# Patient Record
Sex: FEMALE | Race: WHITE | NOT HISPANIC OR LATINO | Employment: OTHER | URBAN - METROPOLITAN AREA
[De-identification: names, ages, dates, MRNs, and addresses within clinical notes are randomized per-mention and may not be internally consistent; named-entity substitution may affect disease eponyms.]

---

## 2017-01-25 ENCOUNTER — OFFICE VISIT (OUTPATIENT)
Dept: CARDIOLOGY | Facility: MEDICAL CENTER | Age: 57
End: 2017-01-25
Payer: MEDICARE

## 2017-01-25 VITALS
BODY MASS INDEX: 32.39 KG/M2 | SYSTOLIC BLOOD PRESSURE: 116 MMHG | WEIGHT: 182.8 LBS | OXYGEN SATURATION: 93 % | DIASTOLIC BLOOD PRESSURE: 70 MMHG | HEIGHT: 63 IN | HEART RATE: 64 BPM

## 2017-01-25 DIAGNOSIS — E78.00 PURE HYPERCHOLESTEROLEMIA: ICD-10-CM

## 2017-01-25 DIAGNOSIS — I45.6 WPW (WOLFF-PARKINSON-WHITE SYNDROME): ICD-10-CM

## 2017-01-25 DIAGNOSIS — E11.9 TYPE 2 DIABETES MELLITUS WITHOUT COMPLICATION, WITHOUT LONG-TERM CURRENT USE OF INSULIN (HCC): ICD-10-CM

## 2017-01-25 DIAGNOSIS — I45.10 RBBB: ICD-10-CM

## 2017-01-25 DIAGNOSIS — I44.5: ICD-10-CM

## 2017-01-25 DIAGNOSIS — I10 ESSENTIAL HYPERTENSION: ICD-10-CM

## 2017-01-25 DIAGNOSIS — I42.1 HYPERTROPHIC OBSTRUCTIVE CARDIOMYOPATHY (HCC): ICD-10-CM

## 2017-01-25 PROCEDURE — 99214 OFFICE O/P EST MOD 30 MIN: CPT | Performed by: NURSE PRACTITIONER

## 2017-01-25 PROCEDURE — 3017F COLORECTAL CA SCREEN DOC REV: CPT | Mod: 8P | Performed by: NURSE PRACTITIONER

## 2017-01-25 PROCEDURE — 1036F TOBACCO NON-USER: CPT | Performed by: NURSE PRACTITIONER

## 2017-01-25 PROCEDURE — G8432 DEP SCR NOT DOC, RNG: HCPCS | Performed by: NURSE PRACTITIONER

## 2017-01-25 PROCEDURE — G8419 CALC BMI OUT NRM PARAM NOF/U: HCPCS | Performed by: NURSE PRACTITIONER

## 2017-01-25 PROCEDURE — 3045F PR MOST RECENT HEMOGLOBIN A1C LEVEL 7.0-9.0%: CPT | Performed by: NURSE PRACTITIONER

## 2017-01-25 PROCEDURE — G8484 FLU IMMUNIZE NO ADMIN: HCPCS | Performed by: NURSE PRACTITIONER

## 2017-01-25 PROCEDURE — 3014F SCREEN MAMMO DOC REV: CPT | Mod: 8P | Performed by: NURSE PRACTITIONER

## 2017-01-25 ASSESSMENT — ENCOUNTER SYMPTOMS
CLAUDICATION: 0
ORTHOPNEA: 0
PND: 0
FEVER: 0
COUGH: 0
ABDOMINAL PAIN: 0
PALPITATIONS: 0
DIZZINESS: 0
MYALGIAS: 0
SHORTNESS OF BREATH: 0

## 2017-01-25 NOTE — Clinical Note
[facility]   [address] [facility]  [place] [address]  Phone: [phone]  Fax: [phone]              Kizzy Chapman  1960    Encounter Date: 1/25/2017    TASHA Arnold          PROGRESS NOTE:  Subjective:   Kizzy Chapman is a 55 y.o. female who presents today for 6 month follow up.    She is a patient of Dr. Arriaga in our office. Hx of HTN, HLD, DM, and hypertrophic cardiomyopathy with moderately-severe LVH.    Her BP is much more controlled now. She is feeling great. She did forget to get her labs done, she will do them this week.    She knows she has put on weight and needs to work on her diet/exercise.    She has occasional palpitations but not many.     She has had no episodes of chest pain, dizziness/lightheadedness, shortness of breath, orthopnea, or peripheral edema.    Past Medical History   Diagnosis Date   • Anxiety    • Hypertension    • Hypercholesterolemia    • Psychiatric disorder    • Hypertrophic obstructive cardiomyopathy(425.11) 4/1/2015   • Diabetes (CMS-HCC)    • Obesity      Past Surgical History   Procedure Laterality Date   • Other cardiac surgery       ablation of heart for WPW   • Other orthopedic surgery       tib fib with pins; left total hip replacement   • Aicd implant       History reviewed. No pertinent family history.  History   Smoking status   • Former Smoker -- 0.10 packs/day for 10 years   • Types: Cigarettes   • Quit date: 04/27/2015   Smokeless tobacco   • Never Used     Allergies   Allergen Reactions   • Epinephrine      Tightness in arms and legs     Outpatient Encounter Prescriptions as of 1/25/2017   Medication Sig Dispense Refill   • atorvastatin (LIPITOR) 40 MG Tab Take 1 Tab by mouth every day. 90 Tab 3   • metoprolol (LOPRESSOR) 100 MG Tab Take 1 Tab by mouth 2 times a day. 180 Tab 3   • diltiazem CD (CARDIZEM CD) 120 MG CAPSULE SR 24 HR Take 2 Caps by mouth every day. 180 Cap 3   •  "oxycodone-acetaminophen (PERCOCET) 5-325 MG Tab Take 1-2 Tabs by mouth every four hours as needed.     • insulin aspart (NOVOLOG) 100 UNIT/ML Solution Inject  as instructed 3 times a day before meals.     • Cholecalciferol (VITAMIN D) 400 UNIT Tab Take 400 Units by mouth 2 Times a Day.     • metformin (GLUCOPHAGE) 500 MG Tab Take 500 mg by mouth every day.     • Alendronate Sodium (FOSAMAX PO) Take  by mouth.     • MAGNESIUM PO Take  by mouth.     • insulin glargine (LANTUS) 100 UNIT/ML SOLN Inject 2-5 Units as instructed 2 Times a Day. Based on carbs.     • Calcium Carbonate-Vitamin D (CALCIUM + D PO) Take 1 Tab by mouth 2 Times a Day.     • B Complex Vitamins (VITAMIN B COMPLEX PO) Take 1 Tab by mouth every day.     • clonidine (CATAPRES) 0.1 MG Tab Take 1 Tab by mouth as needed (Elevated BP >160; up to TID). 90 Tab 3   • diazepam (VALIUM) 10 MG tablet Take 10 mg by mouth every bedtime.       No facility-administered encounter medications on file as of 1/25/2017.     Review of Systems   Constitutional: Negative for fever and malaise/fatigue.   Respiratory: Negative for cough and shortness of breath.    Cardiovascular: Negative for chest pain, palpitations, orthopnea, claudication, leg swelling and PND.   Gastrointestinal: Negative for abdominal pain.   Musculoskeletal: Negative for myalgias.        R ankle boot post fibula fracture; crutches; wheelchair   Neurological: Negative for dizziness.   Psychiatric/Behavioral:        Tearful about HTN and moderate LVH   All other systems reviewed and are negative.       Objective:   /70 mmHg  Pulse 64  Ht 1.6 m (5' 3\")  Wt 82.918 kg (182 lb 12.8 oz)  BMI 32.39 kg/m2  SpO2 93%    Physical Exam   Constitutional: She is oriented to person, place, and time. She appears well-developed. No distress.   obese   HENT:   Head: Normocephalic and atraumatic.   Eyes: EOM are normal.   Neck: Normal range of motion. No JVD present.   Cardiovascular: Normal rate, regular " rhythm, normal heart sounds and intact distal pulses.    No murmur heard.  Pulmonary/Chest: Effort normal and breath sounds normal. No respiratory distress.   Abdominal: Soft. Bowel sounds are normal.   Musculoskeletal: Normal range of motion. She exhibits no edema.   Neurological: She is alert and oriented to person, place, and time.   Skin: Skin is warm and dry.   Psychiatric: She has a normal mood and affect.   Nursing note and vitals reviewed.    Lab Results   Component Value Date/Time    CHOLESTEROL,* 05/25/2016 12:30 PM    * 05/25/2016 12:30 PM    HDL 45 05/25/2016 12:30 PM    TRIGLYCERIDES 301* 05/25/2016 12:30 PM       Lab Results   Component Value Date/Time    SODIUM 138 05/25/2016 12:30 PM    POTASSIUM 3.9 05/25/2016 12:30 PM    CHLORIDE 104 05/25/2016 12:30 PM    CO2 24 05/25/2016 12:30 PM    GLUCOSE 179* 05/25/2016 12:30 PM    BUN 11 05/25/2016 12:30 PM    CREATININE 0.57 05/25/2016 12:30 PM     Lab Results   Component Value Date/Time    ALKALINE PHOSPHATASE 73 05/25/2016 12:30 PM    AST(SGOT) 23 05/25/2016 12:30 PM    ALT(SGPT) 34 05/25/2016 12:30 PM    TOTAL BILIRUBIN 0.5 05/25/2016 12:30 PM      Lab Results   Component Value Date/Time    WBC 7.6 05/25/2016 12:30 PM    RBC 4.71 05/25/2016 12:30 PM    HEMOGLOBIN 14.5 05/25/2016 12:30 PM    HEMATOCRIT 43.3 05/25/2016 12:30 PM    MCV 91.9 05/25/2016 12:30 PM    MCH 30.8 05/25/2016 12:30 PM    MCHC 33.5* 05/25/2016 12:30 PM    MPV 9.2 05/25/2016 12:30 PM      3/4/15 ECHO CONCLUSIONS  Hyperdynamic left ventricular systolic function with near systolic   cavity obliteration.  Left ventricular ejection fraction is greater than 75%.  Moderately severe concentric left ventricular hypertrophy.  Grade I diastolic dysfunction.  No significant valve disease or flow abnormalities.   Unable to estimate pulmonary artery pressure due to an inadequate   tricuspid regurgitant jet.    Assessment:     1. Type 2 diabetes mellitus without complication, without  long-term current use of insulin (HCC)  HEMOGLOBIN A1C   2. Essential hypertension  ECHOCARDIOGRAM COMP W/O CONT   3. Pure hypercholesterolemia  ECHOCARDIOGRAM COMP W/O CONT   4. Hypertrophic obstructive cardiomyopathy (CMS-HCC)  ECHOCARDIOGRAM COMP W/O CONT   5. Left posterior fascicular hemiblock     6. RBBB     7. WPW (Ernesto-Parkinson-White syndrome) s/p ablation 1997         Medical Decision Making:  Today's Assessment / Status / Plan:     1. WPW with previous ablation in '97, stable with minimal palpitations. Good rate control on metoprolol and diltiazem.    2. Left hemiblock and RBBB, stable. Follow clinically.    3. HTN, improved control. Continue metoprolol at 100 mg BID, diltiazem 120 mg BID, and use clonidine 0.1 mg PRN for SBP >160. She has had great control with only needing clonidine a few times since our last apt.    4. HLD, Lipitor. LDL goal <100 with DM. Check lipid and CMP soon.    5. DM, managed by PCP.    6. Hypertrophic cardiomyopathy with moderately severe LVH, stable. Repeat echo ordered for review.    FU in clinic in 6 months with TT or SC with yearly lab and echo review    Patient verbalizes understanding and agrees with the plan of care.     Collaborating MD: Olimpia SALCEDO    Spent 25 minutes in face-to-face patient contact in which greater than 50% of the visit was spent in counseling/coordination of care of medication management, symptom management, re-assurance, discussion of HTN, LVH, and ordered labs and echo.        No Recipients

## 2017-01-25 NOTE — MR AVS SNAPSHOT
"        Kizzy Chapman   2017 3:40 PM   Office Visit   MRN: [id number]    Department:  [facility]   Dept Phone:  [phone]    Description:  Female : 1960   Provider:  TASHA Arnold           Reason for Visit     Follow-Up           Allergies as of 2017     Allergen Noted Reactions    Epinephrine 2015       Tightness in arms and legs      You were diagnosed with     Type 2 diabetes mellitus without complication, without long-term current use of insulin (CMS-[facility])   [6878053]       Essential hypertension   [5457262]       Pure hypercholesterolemia   [272.0.ICD-9-CM]       Hypertrophic obstructive cardiomyopathy (CMS-[facility])   [0954938]       Left posterior fascicular hemiblock   [357408]       RBBB   [258447]       WPW (Ernesto-Parkinson-White syndrome)   [521086]         Vital Signs     Blood Pressure Pulse Height Weight Body Mass Index Oxygen Saturation    116/70 mmHg 64 1.6 m (5' 3\") 82.918 kg (182 lb 12.8 oz) 32.39 kg/m2 93%    Smoking Status                   Former Smoker           Basic Information     Date Of Birth Sex Race Ethnicity Preferred Language    1960 Female White Non- English      Your appointments     2017  4:15 PM   ECHO with ECHO [facility], [facility] EXAM 9   ECHOCARDIOLOGY [facility] ([facility])    [address] [address]   [phone]           No prep            Mar 29, 2017  3:40 PM   FOLLOW UP with VINAYAK ArnoldRARELIS   [facility] (--)    [address], [address]  [facility] [id number] [phone]              Problem List              ICD-10-CM Priority Class Noted - Resolved    Left posterior fascicular hemiblock I44.5 Medium  3/3/2015 - Present    RBBB I45.10 Medium  3/3/2015 - Present    WPW (Ernesto-Parkinson-White syndrome) s/p ablation  I45.6 Medium  3/3/2015 - Present    HTN (hypertension) I10 Medium  3/3/2015 - Present    Hyperlipidemia E78.5 Medium  3/3/2015 - Present    DM2 " (diabetes mellitus, type 2) (CMS-HCC) E11.9 Medium  3/3/2015 - Present    Hypertrophic obstructive cardiomyopathy (CMS-HCC) I42.1 Medium  4/1/2015 - Present    Migraine G43.909 Low  5/1/2015 - Present      Health Maintenance        Date Due Completion Dates    IMM HEP B VACCINE (1 of 3 - Primary Series) 1960 ---    DIABETES MONOFILAMENT / LE EXAM 3/30/1961 ---    RETINAL SCREENING 9/30/1978 ---    URINE ACR / MICROALBUMIN 9/30/1978 ---    IMM DTaP/Tdap/Td Vaccine (1 - Tdap) 9/30/1979 ---    IMM PNEUMOCOCCAL 19-64 (ADULT) MEDIUM RISK SERIES (1 of 1 - PPSV23) 9/30/1979 ---    PAP SMEAR 9/30/1981 ---    MAMMOGRAM 9/30/2000 ---    COLONOSCOPY 9/30/2010 ---    IMM INFLUENZA (1) 9/1/2016 ---    A1C SCREENING 11/25/2016 5/25/2016    FASTING LIPID PROFILE 5/25/2017 5/25/2016    SERUM CREATININE 5/25/2017 5/25/2016, 5/15/2016, 4/1/2016, 2/22/2016, 3/22/2015, 3/21/2015, 8/22/2013            Current Immunizations     No immunizations on file.      Below and/or attached are the medications your provider expects you to take. Review all of your home medications and newly ordered medications with your provider and/or pharmacist. Follow medication instructions as directed by your provider and/or pharmacist. Please keep your medication list with you and share with your provider. Update the information when medications are discontinued, doses are changed, or new medications (including over-the-counter products) are added; and carry medication information at all times in the event of emergency situations     Allergies:  EPINEPHRINE - (reactions not documented)               Medications  Valid as of: January 25, 2017 -  3:59 PM    Generic Name Brand Name Tablet Size Instructions for use    Alendronate Sodium   Take  by mouth.        Atorvastatin Calcium (Tab) LIPITOR 40 MG Take 1 Tab by mouth every day.        B Complex Vitamins   Take 1 Tab by mouth every day.        Calcium Citrate-Vitamin D   Take 1 Tab by mouth 2 Times a Day.          Cholecalciferol (Tab) VITAMIN D 400 UNIT Take 400 Units by mouth 2 Times a Day.        CloNIDine HCl (Tab) CATAPRES 0.1 MG Take 1 Tab by mouth as needed (Elevated BP >160; up to TID).        DiazePAM (Tab) VALIUM 10 MG Take 10 mg by mouth every bedtime.        DiltiaZEM HCl Coated Beads (CAPSULE SR 24 HR) CARDIZEM  MG Take 2 Caps by mouth every day.        Insulin Aspart (Solution) NOVOLOG 100 UNIT/ML Inject  as instructed 3 times a day before meals.        Insulin Glargine (Solution) LANTUS 100 UNIT/ML Inject 2-5 Units as instructed 2 Times a Day. Based on carbs.        Magnesium   Take  by mouth.        MetFORMIN HCl (Tab) GLUCOPHAGE 500 MG Take 500 mg by mouth every day.        Metoprolol Tartrate (Tab) LOPRESSOR 100 MG Take 1 Tab by mouth 2 times a day.        Oxycodone-Acetaminophen (Tab) PERCOCET 5-325 MG Take 1-2 Tabs by mouth every four hours as needed.        .                 Medicines prescribed today were sent to:     [facility][id number] - [place], [place] - [address] [place] [id number]    Phone: [phone] Fax: [phone]    Open 24 Hours?: No      Medication refill instructions:       If your prescription bottle indicates you have medication refills left, it is not necessary to call your provider’s office. Please contact your pharmacy and they will refill your medication.    If your prescription bottle indicates you do not have any refills left, you may request refills at any time through one of the following ways: The online Game[employer] system (except Urgent Care), by calling your provider’s office, or by asking your pharmacy to contact your provider’s office with a refill request. Medication refills are processed only during regular business hours and may not be available until the next business day. Your provider may request additional information or to have a follow-up visit with you prior to refilling your medication.   *Please Note:  Medication refills are assigned a new Rx number when refilled electronically. Your pharmacy may indicate that no refills were authorized even though a new prescription for the same medication is available at the pharmacy. Please request the medicine by name with the pharmacy before contacting your provider for a refill.        Your To Do List     Future Labs/Procedures Complete By Expires    ECHOCARDIOGRAM COMP W/O CONT  As directed 1/26/2018    HEMOGLOBIN A1C  As directed 1/26/2018         SurDoc Access Code: [email or web address]  Expires: 2/24/2017  3:59 PM    SurDoc  A secure, online tool to manage your health information     [employer]’s SurDoc® is a secure, online tool that connects you to your personalized health information from the privacy of your home -- day or night - making it very easy for you to manage your healthcare. Once the activation process is completed, you can even access your medical information using the SurDoc juliet, which is available for free in the Apple Juliet store or Google Play store.     SurDoc provides the following levels of access (as shown below):   My Chart Features   [facility] Doctor [facility]  Specialists [facility]  Urgent  Care Non-[facility]  Primary Care  Doctor   Email your healthcare team securely and privately 24/7 X X X    Manage appointments: schedule your next appointment; view details of past/upcoming appointments X      Request prescription refills. X      View recent personal medical records, including lab and immunizations X X X X   View health record, including health history, allergies, medications X X X X   Read reports about your outpatient visits, procedures, consult and ER notes X X X X   See your discharge summary, which is a recap of your hospital and/or ER visit that includes your diagnosis, lab results, and care plan. X X       How to register for SurDoc:  1. Go to  [email or web address].  2. Click on the Sign Up Now box, which takes you to the New  Member Sign Up page. You will need to provide the following information:  a. Enter your Se[employer]ore Access Code exactly as it appears at the top of this page. (You will not need to use this code after you’ve completed the sign-up process. If you do not sign up before the expiration date, you must request a new code.)   b. Enter your date of birth.   c. Enter your home email address.   d. Click Submit, and follow the next screen’s instructions.  3. Create a Zeppelint ID. This will be your Seclore login ID and cannot be changed, so think of one that is secure and easy to remember.  4. Create a Seclore password. You can change your password at any time.  5. Enter your Password Reset Question and Answer. This can be used at a later time if you forget your password.   6. Enter your e-mail address. This allows you to receive e-mail notifications when new information is available in Seclore.  7. Click Sign Up. You can now view your health information.    For assistance activating your Seclore account, call [phone]

## 2017-01-26 NOTE — PROGRESS NOTES
Subjective:   Kizzy Chapman is a 55 y.o. female who presents today for 6 month follow up.    She is a patient of Dr. Arriaga in our office. Hx of HTN, HLD, DM, and hypertrophic cardiomyopathy with moderately-severe LVH.    Her BP is much more controlled now. She is feeling great. She did forget to get her labs done, she will do them this week.    She knows she has put on weight and needs to work on her diet/exercise.    She has occasional palpitations but not many.     She has had no episodes of chest pain, dizziness/lightheadedness, shortness of breath, orthopnea, or peripheral edema.    Past Medical History   Diagnosis Date   • Anxiety    • Hypertension    • Hypercholesterolemia    • Psychiatric disorder    • Hypertrophic obstructive cardiomyopathy(425.11) 4/1/2015   • Diabetes (CMS-HCC)    • Obesity      Past Surgical History   Procedure Laterality Date   • Other cardiac surgery       ablation of heart for WPW   • Other orthopedic surgery       tib fib with pins; left total hip replacement   • Aicd implant       History reviewed. No pertinent family history.  History   Smoking status   • Former Smoker -- 0.10 packs/day for 10 years   • Types: Cigarettes   • Quit date: 04/27/2015   Smokeless tobacco   • Never Used     Allergies   Allergen Reactions   • Epinephrine      Tightness in arms and legs     Outpatient Encounter Prescriptions as of 1/25/2017   Medication Sig Dispense Refill   • atorvastatin (LIPITOR) 40 MG Tab Take 1 Tab by mouth every day. 90 Tab 3   • metoprolol (LOPRESSOR) 100 MG Tab Take 1 Tab by mouth 2 times a day. 180 Tab 3   • diltiazem CD (CARDIZEM CD) 120 MG CAPSULE SR 24 HR Take 2 Caps by mouth every day. 180 Cap 3   • oxycodone-acetaminophen (PERCOCET) 5-325 MG Tab Take 1-2 Tabs by mouth every four hours as needed.     • insulin aspart (NOVOLOG) 100 UNIT/ML Solution Inject  as instructed 3 times a day before meals.     • Cholecalciferol (VITAMIN D) 400 UNIT Tab Take 400 Units by mouth 2 Times a  "Day.     • metformin (GLUCOPHAGE) 500 MG Tab Take 500 mg by mouth every day.     • Alendronate Sodium (FOSAMAX PO) Take  by mouth.     • MAGNESIUM PO Take  by mouth.     • insulin glargine (LANTUS) 100 UNIT/ML SOLN Inject 2-5 Units as instructed 2 Times a Day. Based on carbs.     • Calcium Carbonate-Vitamin D (CALCIUM + D PO) Take 1 Tab by mouth 2 Times a Day.     • B Complex Vitamins (VITAMIN B COMPLEX PO) Take 1 Tab by mouth every day.     • clonidine (CATAPRES) 0.1 MG Tab Take 1 Tab by mouth as needed (Elevated BP >160; up to TID). 90 Tab 3   • diazepam (VALIUM) 10 MG tablet Take 10 mg by mouth every bedtime.       No facility-administered encounter medications on file as of 1/25/2017.     Review of Systems   Constitutional: Negative for fever and malaise/fatigue.   Respiratory: Negative for cough and shortness of breath.    Cardiovascular: Negative for chest pain, palpitations, orthopnea, claudication, leg swelling and PND.   Gastrointestinal: Negative for abdominal pain.   Musculoskeletal: Negative for myalgias.        R ankle boot post fibula fracture; crutches; wheelchair   Neurological: Negative for dizziness.   Psychiatric/Behavioral:        Tearful about HTN and moderate LVH   All other systems reviewed and are negative.       Objective:   /70 mmHg  Pulse 64  Ht 1.6 m (5' 3\")  Wt 82.918 kg (182 lb 12.8 oz)  BMI 32.39 kg/m2  SpO2 93%    Physical Exam   Constitutional: She is oriented to person, place, and time. She appears well-developed. No distress.   obese   HENT:   Head: Normocephalic and atraumatic.   Eyes: EOM are normal.   Neck: Normal range of motion. No JVD present.   Cardiovascular: Normal rate, regular rhythm, normal heart sounds and intact distal pulses.    No murmur heard.  Pulmonary/Chest: Effort normal and breath sounds normal. No respiratory distress.   Abdominal: Soft. Bowel sounds are normal.   Musculoskeletal: Normal range of motion. She exhibits no edema.   Neurological: She " is alert and oriented to person, place, and time.   Skin: Skin is warm and dry.   Psychiatric: She has a normal mood and affect.   Nursing note and vitals reviewed.    Lab Results   Component Value Date/Time    CHOLESTEROL,* 05/25/2016 12:30 PM    * 05/25/2016 12:30 PM    HDL 45 05/25/2016 12:30 PM    TRIGLYCERIDES 301* 05/25/2016 12:30 PM       Lab Results   Component Value Date/Time    SODIUM 138 05/25/2016 12:30 PM    POTASSIUM 3.9 05/25/2016 12:30 PM    CHLORIDE 104 05/25/2016 12:30 PM    CO2 24 05/25/2016 12:30 PM    GLUCOSE 179* 05/25/2016 12:30 PM    BUN 11 05/25/2016 12:30 PM    CREATININE 0.57 05/25/2016 12:30 PM     Lab Results   Component Value Date/Time    ALKALINE PHOSPHATASE 73 05/25/2016 12:30 PM    AST(SGOT) 23 05/25/2016 12:30 PM    ALT(SGPT) 34 05/25/2016 12:30 PM    TOTAL BILIRUBIN 0.5 05/25/2016 12:30 PM      Lab Results   Component Value Date/Time    WBC 7.6 05/25/2016 12:30 PM    RBC 4.71 05/25/2016 12:30 PM    HEMOGLOBIN 14.5 05/25/2016 12:30 PM    HEMATOCRIT 43.3 05/25/2016 12:30 PM    MCV 91.9 05/25/2016 12:30 PM    MCH 30.8 05/25/2016 12:30 PM    MCHC 33.5* 05/25/2016 12:30 PM    MPV 9.2 05/25/2016 12:30 PM      3/4/15 ECHO CONCLUSIONS  Hyperdynamic left ventricular systolic function with near systolic   cavity obliteration.  Left ventricular ejection fraction is greater than 75%.  Moderately severe concentric left ventricular hypertrophy.  Grade I diastolic dysfunction.  No significant valve disease or flow abnormalities.   Unable to estimate pulmonary artery pressure due to an inadequate   tricuspid regurgitant jet.    Assessment:     1. Type 2 diabetes mellitus without complication, without long-term current use of insulin (HCC)  HEMOGLOBIN A1C   2. Essential hypertension  ECHOCARDIOGRAM COMP W/O CONT   3. Pure hypercholesterolemia  ECHOCARDIOGRAM COMP W/O CONT   4. Hypertrophic obstructive cardiomyopathy (CMS-HCC)  ECHOCARDIOGRAM COMP W/O CONT   5. Left posterior  fascicular hemiblock     6. RBBB     7. WPW (Ernesto-Parkinson-White syndrome) s/p ablation 1997         Medical Decision Making:  Today's Assessment / Status / Plan:     1. WPW with previous ablation in '97, stable with minimal palpitations. Good rate control on metoprolol and diltiazem.    2. Left hemiblock and RBBB, stable. Follow clinically.    3. HTN, improved control. Continue metoprolol at 100 mg BID, diltiazem 120 mg BID, and use clonidine 0.1 mg PRN for SBP >160. She has had great control with only needing clonidine a few times since our last apt.    4. HLD, Lipitor. LDL goal <100 with DM. Check lipid and CMP soon.    5. DM, managed by PCP.    6. Hypertrophic cardiomyopathy with moderately severe LVH, stable. Repeat echo ordered for review.    FU in clinic in 6 months with TT or SC with yearly lab and echo review    Patient verbalizes understanding and agrees with the plan of care.     Collaborating MD: Olimpia SALCEDO    Spent 25 minutes in face-to-face patient contact in which greater than 50% of the visit was spent in counseling/coordination of care of medication management, symptom management, re-assurance, discussion of HTN, LVH, and ordered labs and echo.

## 2017-02-09 LAB
ALBUMIN SERPL-MCNC: 4.4 G/DL (ref 3.5–5.5)
ALBUMIN/GLOB SERPL: 1.8 {RATIO} (ref 1.1–2.5)
ALP SERPL-CCNC: 104 IU/L (ref 39–117)
ALT SERPL-CCNC: 26 IU/L (ref 0–32)
AST SERPL-CCNC: 14 IU/L (ref 0–40)
BILIRUB SERPL-MCNC: 0.3 MG/DL (ref 0–1.2)
BUN SERPL-MCNC: 12 MG/DL (ref 6–24)
BUN/CREAT SERPL: 18 (ref 9–23)
CALCIUM SERPL-MCNC: 10 MG/DL (ref 8.7–10.2)
CHLORIDE SERPL-SCNC: 97 MMOL/L (ref 96–106)
CHOLEST SERPL-MCNC: 232 MG/DL (ref 100–199)
CO2 SERPL-SCNC: 25 MMOL/L (ref 18–29)
COMMENT 011824: ABNORMAL
CREAT SERPL-MCNC: 0.68 MG/DL (ref 0.57–1)
GLOBULIN SER CALC-MCNC: 2.4 G/DL (ref 1.5–4.5)
GLUCOSE SERPL-MCNC: 253 MG/DL (ref 65–99)
HBA1C MFR BLD: 10.1 % (ref 4.8–5.6)
HDLC SERPL-MCNC: 43 MG/DL
LDLC SERPL CALC-MCNC: ABNORMAL MG/DL (ref 0–99)
POTASSIUM SERPL-SCNC: 4.5 MMOL/L (ref 3.5–5.2)
PROT SERPL-MCNC: 6.8 G/DL (ref 6–8.5)
SODIUM SERPL-SCNC: 138 MMOL/L (ref 134–144)
TRIGL SERPL-MCNC: 515 MG/DL (ref 0–149)
VLDLC SERPL CALC-MCNC: ABNORMAL MG/DL (ref 5–40)

## 2017-02-14 ENCOUNTER — APPOINTMENT (OUTPATIENT)
Dept: CARDIOLOGY | Facility: MEDICAL CENTER | Age: 57
End: 2017-02-14
Attending: NURSE PRACTITIONER
Payer: MEDICARE

## 2017-02-16 ENCOUNTER — TELEPHONE (OUTPATIENT)
Dept: CARDIOLOGY | Facility: MEDICAL CENTER | Age: 57
End: 2017-02-16

## 2017-02-16 DIAGNOSIS — E11.00 TYPE 2 DIABETES MELLITUS WITH HYPEROSMOLARITY WITHOUT COMA, WITHOUT LONG-TERM CURRENT USE OF INSULIN (HCC): ICD-10-CM

## 2017-02-16 DIAGNOSIS — I10 ESSENTIAL HYPERTENSION: ICD-10-CM

## 2017-02-16 DIAGNOSIS — E78.00 PURE HYPERCHOLESTEROLEMIA: ICD-10-CM

## 2017-02-16 DIAGNOSIS — E78.5 HYPERLIPIDEMIA, UNSPECIFIED HYPERLIPIDEMIA TYPE: ICD-10-CM

## 2017-02-16 RX ORDER — ATORVASTATIN CALCIUM 80 MG/1
80 TABLET, FILM COATED ORAL DAILY
Qty: 90 TAB | Refills: 3 | OUTPATIENT
Start: 2017-02-16 | End: 2018-04-10 | Stop reason: SDUPTHER

## 2017-02-16 NOTE — TELEPHONE ENCOUNTER
----- Message from TASHA Arnold sent at 2/10/2017  8:57 AM PST -----  Sugar is extremely elevated and her HgbA1c shows poor control of her DM-have her call her PCP for review and possible medication change. Her cholesterol is also in poor control, increase her lipitor to 80 mg QPM and repeat CMP and lipid panel in 6 weeks for review. SC

## 2017-02-16 NOTE — TELEPHONE ENCOUNTER
S/w pt and discussed lab results in detail. She agrees to increasing lipitor to 80mg QD and has f/u w/ her PCP, Dr. Jaimes, on the 20th. We also discussed lifestyle changes including changes in diet and exercise. She agrees to getting repeat labs done just prior to seeing SC 3/29. Orders placed.     Called in increase in lipitor to pts pharmacy.

## 2017-03-04 ENCOUNTER — HOSPITAL ENCOUNTER (EMERGENCY)
Facility: MEDICAL CENTER | Age: 57
End: 2017-03-05
Payer: MEDICARE

## 2017-03-04 VITALS
SYSTOLIC BLOOD PRESSURE: 201 MMHG | DIASTOLIC BLOOD PRESSURE: 95 MMHG | OXYGEN SATURATION: 93 % | RESPIRATION RATE: 14 BRPM | TEMPERATURE: 97 F | BODY MASS INDEX: 31.68 KG/M2 | HEART RATE: 72 BPM | HEIGHT: 63 IN | WEIGHT: 178.79 LBS

## 2017-03-04 PROCEDURE — 302449 STATCHG TRIAGE ONLY (STATISTIC)

## 2017-03-05 NOTE — ED NOTES
"Chief Complaint   Patient presents with   • Migraine   • Hypertension     Patient ambulatory to triage. Patient states that she has been \"very stressed\" due to a break up with her boyfriend a couple of days ago. Patient states that she had three auro's today, then a right frontal migraine. Patient had increased BP on arrival, says that she takes her HTN medications as prescribed. /109 mmHg  Pulse 73  Temp(Src) 36 °C (96.8 °F)  Resp 16  Ht 1.6 m (5' 3\")  Wt 81.1 kg (178 lb 12.7 oz)  BMI 31.68 kg/m2  SpO2 96%     "

## 2017-03-15 ENCOUNTER — HOSPITAL ENCOUNTER (EMERGENCY)
Facility: MEDICAL CENTER | Age: 57
End: 2017-03-15
Attending: EMERGENCY MEDICINE
Payer: MEDICARE

## 2017-03-15 ENCOUNTER — APPOINTMENT (OUTPATIENT)
Dept: RADIOLOGY | Facility: MEDICAL CENTER | Age: 57
End: 2017-03-15
Attending: EMERGENCY MEDICINE
Payer: MEDICARE

## 2017-03-15 VITALS
TEMPERATURE: 99.5 F | OXYGEN SATURATION: 89 % | RESPIRATION RATE: 20 BRPM | SYSTOLIC BLOOD PRESSURE: 130 MMHG | HEART RATE: 93 BPM | HEIGHT: 63 IN | DIASTOLIC BLOOD PRESSURE: 79 MMHG | WEIGHT: 178.57 LBS | BODY MASS INDEX: 31.64 KG/M2

## 2017-03-15 DIAGNOSIS — J02.0 STREP THROAT: ICD-10-CM

## 2017-03-15 DIAGNOSIS — R50.9 FEVER, UNSPECIFIED FEVER CAUSE: ICD-10-CM

## 2017-03-15 LAB
ALBUMIN SERPL BCP-MCNC: 4.2 G/DL (ref 3.2–4.9)
ALBUMIN/GLOB SERPL: 1.4 G/DL
ALP SERPL-CCNC: 91 U/L (ref 30–99)
ALT SERPL-CCNC: 36 U/L (ref 2–50)
ANION GAP SERPL CALC-SCNC: 12 MMOL/L (ref 0–11.9)
AST SERPL-CCNC: 24 U/L (ref 12–45)
BASOPHILS # BLD AUTO: 0.5 % (ref 0–1.8)
BASOPHILS # BLD: 0.04 K/UL (ref 0–0.12)
BILIRUB SERPL-MCNC: 0.4 MG/DL (ref 0.1–1.5)
BUN SERPL-MCNC: 8 MG/DL (ref 8–22)
CALCIUM SERPL-MCNC: 9.4 MG/DL (ref 8.5–10.5)
CHLORIDE SERPL-SCNC: 99 MMOL/L (ref 96–112)
CO2 SERPL-SCNC: 21 MMOL/L (ref 20–33)
CREAT SERPL-MCNC: 0.67 MG/DL (ref 0.5–1.4)
DEPRECATED S PYO AG THROAT QL EIA: ABNORMAL
EOSINOPHIL # BLD AUTO: 0.02 K/UL (ref 0–0.51)
EOSINOPHIL NFR BLD: 0.3 % (ref 0–6.9)
ERYTHROCYTE [DISTWIDTH] IN BLOOD BY AUTOMATED COUNT: 38.8 FL (ref 35.9–50)
GFR SERPL CREATININE-BSD FRML MDRD: >60 ML/MIN/1.73 M 2
GLOBULIN SER CALC-MCNC: 3.1 G/DL (ref 1.9–3.5)
GLUCOSE BLD-MCNC: 189 MG/DL (ref 65–99)
GLUCOSE SERPL-MCNC: 202 MG/DL (ref 65–99)
HCT VFR BLD AUTO: 43.5 % (ref 37–47)
HGB BLD-MCNC: 15 G/DL (ref 12–16)
IMM GRANULOCYTES # BLD AUTO: 0.02 K/UL (ref 0–0.11)
IMM GRANULOCYTES NFR BLD AUTO: 0.3 % (ref 0–0.9)
LACTATE BLD-SCNC: 1.4 MMOL/L (ref 0.5–2)
LACTATE BLD-SCNC: 1.8 MMOL/L (ref 0.5–2)
LYMPHOCYTES # BLD AUTO: 1.23 K/UL (ref 1–4.8)
LYMPHOCYTES NFR BLD: 15.8 % (ref 22–41)
MCH RBC QN AUTO: 31.1 PG (ref 27–33)
MCHC RBC AUTO-ENTMCNC: 34.5 G/DL (ref 33.6–35)
MCV RBC AUTO: 90.1 FL (ref 81.4–97.8)
MONOCYTES # BLD AUTO: 1.23 K/UL (ref 0–0.85)
MONOCYTES NFR BLD AUTO: 15.8 % (ref 0–13.4)
NEUTROPHILS # BLD AUTO: 5.26 K/UL (ref 2–7.15)
NEUTROPHILS NFR BLD: 67.3 % (ref 44–72)
NRBC # BLD AUTO: 0 K/UL
NRBC BLD AUTO-RTO: 0 /100 WBC
PLATELET # BLD AUTO: 262 K/UL (ref 164–446)
PMV BLD AUTO: 9.2 FL (ref 9–12.9)
POTASSIUM SERPL-SCNC: 4.4 MMOL/L (ref 3.6–5.5)
PROT SERPL-MCNC: 7.3 G/DL (ref 6–8.2)
RBC # BLD AUTO: 4.83 M/UL (ref 4.2–5.4)
SIGNIFICANT IND 70042: ABNORMAL
SITE SITE: ABNORMAL
SODIUM SERPL-SCNC: 132 MMOL/L (ref 135–145)
SOURCE SOURCE: ABNORMAL
WBC # BLD AUTO: 7.8 K/UL (ref 4.8–10.8)

## 2017-03-15 PROCEDURE — 85025 COMPLETE CBC W/AUTO DIFF WBC: CPT

## 2017-03-15 PROCEDURE — 87880 STREP A ASSAY W/OPTIC: CPT

## 2017-03-15 PROCEDURE — 700102 HCHG RX REV CODE 250 W/ 637 OVERRIDE(OP): Performed by: EMERGENCY MEDICINE

## 2017-03-15 PROCEDURE — 96375 TX/PRO/DX INJ NEW DRUG ADDON: CPT

## 2017-03-15 PROCEDURE — A9270 NON-COVERED ITEM OR SERVICE: HCPCS | Performed by: EMERGENCY MEDICINE

## 2017-03-15 PROCEDURE — 87040 BLOOD CULTURE FOR BACTERIA: CPT | Mod: 91

## 2017-03-15 PROCEDURE — 80053 COMPREHEN METABOLIC PANEL: CPT

## 2017-03-15 PROCEDURE — 96365 THER/PROPH/DIAG IV INF INIT: CPT

## 2017-03-15 PROCEDURE — 83605 ASSAY OF LACTIC ACID: CPT

## 2017-03-15 PROCEDURE — 36415 COLL VENOUS BLD VENIPUNCTURE: CPT

## 2017-03-15 PROCEDURE — 82962 GLUCOSE BLOOD TEST: CPT

## 2017-03-15 PROCEDURE — 700111 HCHG RX REV CODE 636 W/ 250 OVERRIDE (IP): Performed by: EMERGENCY MEDICINE

## 2017-03-15 PROCEDURE — 71020 DX-CHEST-2 VIEWS: CPT

## 2017-03-15 PROCEDURE — 99284 EMERGENCY DEPT VISIT MOD MDM: CPT

## 2017-03-15 RX ORDER — ACETAMINOPHEN 325 MG/1
650 TABLET ORAL ONCE
Status: COMPLETED | OUTPATIENT
Start: 2017-03-15 | End: 2017-03-15

## 2017-03-15 RX ORDER — AMOXICILLIN 500 MG/1
500 CAPSULE ORAL 3 TIMES DAILY
Qty: 30 CAP | Refills: 0 | Status: SHIPPED | OUTPATIENT
Start: 2017-03-15 | End: 2017-03-15

## 2017-03-15 RX ORDER — AMPICILLIN AND SULBACTAM 2; 1 G/1; G/1
3 INJECTION, POWDER, FOR SOLUTION INTRAMUSCULAR; INTRAVENOUS ONCE
Status: COMPLETED | OUTPATIENT
Start: 2017-03-15 | End: 2017-03-15

## 2017-03-15 RX ORDER — KETOROLAC TROMETHAMINE 30 MG/ML
30 INJECTION, SOLUTION INTRAMUSCULAR; INTRAVENOUS ONCE
Status: COMPLETED | OUTPATIENT
Start: 2017-03-15 | End: 2017-03-15

## 2017-03-15 RX ORDER — AMOXICILLIN 500 MG/1
500 CAPSULE ORAL 3 TIMES DAILY
Qty: 30 CAP | Refills: 0 | Status: SHIPPED | OUTPATIENT
Start: 2017-03-15 | End: 2017-05-11

## 2017-03-15 RX ADMIN — ACETAMINOPHEN 650 MG: 325 TABLET, FILM COATED ORAL at 19:35

## 2017-03-15 RX ADMIN — KETOROLAC TROMETHAMINE 30 MG: 30 INJECTION, SOLUTION INTRAMUSCULAR; INTRAVENOUS at 18:13

## 2017-03-15 RX ADMIN — AMPICILLIN SODIUM AND SULBACTAM SODIUM 3 G: 2; 1 INJECTION, POWDER, FOR SOLUTION INTRAMUSCULAR; INTRAVENOUS at 19:36

## 2017-03-15 NOTE — ED AVS SNAPSHOT
3/15/2017          Kizzy Chapman  [address] [place] [id number]    Dear Kizzy:    [facility] wants to ensure your discharge home is safe and you or your loved ones have had all your questions answered regarding your care after you leave the hospital.    You may receive a telephone call within two days of your discharge.  This call is to make certain you understand your discharge instructions as well as ensure we provided you with the best care possible during your stay with us.     The call will only last approximately 3-5 minutes and will be done by a nurse.    Once again, we want to ensure your discharge home is safe and that you have a clear understanding of any next steps in your care.  If you have any questions or concerns, please do not hesitate to contact us, we are here for you.  Thank you for choosing [facility] for your healthcare needs.    Sincerely,    Reed Oliveira    [facility]

## 2017-03-15 NOTE — ED AVS SNAPSHOT
DigitalScirocco Access Code: [email or web address]  Expires: 4/14/2017  8:34 PM    DigitalScirocco  A secure, online tool to manage your health information     [employer]’s DigitalScirocco® is a secure, online tool that connects you to your personalized health information from the privacy of your home -- day or night - making it very easy for you to manage your healthcare. Once the activation process is completed, you can even access your medical information using the DigitalScirocco juliet, which is available for free in the Apple Juliet store or Google Play store.     DigitalScirocco provides the following levels of access (as shown below):   My Chart Features   [facility] Primary Care Doctor [facility]  Specialists [facility]  Urgent  Care Non-[facility]  Primary Care  Doctor   Email your healthcare team securely and privately 24/7 X X X X   Manage appointments: schedule your next appointment; view details of past/upcoming appointments X      Request prescription refills. X      View recent personal medical records, including lab and immunizations X X X X   View health record, including health history, allergies, medications X X X X   Read reports about your outpatient visits, procedures, consult and ER notes X X X X   See your discharge summary, which is a recap of your hospital and/or ER visit that includes your diagnosis, lab results, and care plan. X X       How to register for DigitalScirocco:  1. Go to  [email or web address].  2. Click on the Sign Up Now box, which takes you to the New Member Sign Up page. You will need to provide the following information:  a. Enter your DigitalScirocco Access Code exactly as it appears at the top of this page. (You will not need to use this code after you’ve completed the sign-up process. If you do not sign up before the expiration date, you must request a new code.)   b. Enter your date of birth.   c. Enter your home email address.   d. Click Submit, and follow the next screen’s instructions.  3. Create a DigitalScirocco ID. This will be your DigitalScirocco  login ID and cannot be changed, so think of one that is secure and easy to remember.  4. Create a [facility] password. You can change your password at any time.  5. Enter your Password Reset Question and Answer. This can be used at a later time if you forget your password.   6. Enter your e-mail address. This allows you to receive e-mail notifications when new information is available in [facility].  7. Click Sign Up. You can now view your health information.    For assistance activating your [facility] account, call [phone]

## 2017-03-15 NOTE — ED AVS SNAPSHOT
Home Care Instructions                                                                                                                Kizzy Chapman   MRN: [id number]    Department:  [facility], Emergency Dept   Date of Visit:  3/15/2017            [facility], Emergency Dept    [address] [place] [id number]    Phone:  [phone]      You were seen by     Sima Raymond M.D.      Your Diagnosis Was     Strep throat     J02.0       These are the medications you received during your hospitalization from 03/15/2017 1411 to 03/15/2017 2034     Date/Time Order Dose Route Action    03/15/2017 1813 ketorolac (TORADOL) injection 30 mg 30 mg Intravenous Given    03/15/2017 1936 ampicillin/sulbactam (UNASYN) injection 3 g 3 g Intravenous Given    03/15/2017 1935 acetaminophen (TYLENOL) tablet 650 mg 650 mg Oral Given      Follow-up Information     1. Follow up with Madonna Jaimes M.D..    Specialty:  Family Medicine    Contact information    [address] [facility]  [address] [place] [address] [phone]          2. Follow up with [facility], Emergency Dept.    Specialty:  Emergency Medicine    Why:  Return for worsening pain, fever, difficulty breathing or other concerns    Contact information    [address] [place] [id number] [phone]      Medication Information     Review all of your home medications and newly ordered medications with your primary doctor and/or pharmacist as soon as possible. Follow medication instructions as directed by your doctor and/or pharmacist.     Please keep your complete medication list with you and share with your physician. Update the information when medications are discontinued, doses are changed, or new medications (including over-the-counter products) are added; and carry medication information at all times in the event of emergency situations.               Medication List      START taking these  medications        Instructions    Morning Afternoon Evening Bedtime    amoxicillin 500 MG Caps   Commonly known as:  AMOXIL        Take 1 Cap by mouth 3 times a day.   Dose:  500 mg                          ASK your doctor about these medications        Instructions    Morning Afternoon Evening Bedtime    atorvastatin 80 MG tablet   Commonly known as:  LIPITOR        Take 1 Tab by mouth every day.   Dose:  80 mg                        CALCIUM + D PO        Take 1 Tab by mouth 2 Times a Day.   Dose:  1 Tab                        Cholecalciferol 400 UNIT Tabs   Commonly known as:  VITAMIN D        Take 400 Units by mouth 2 Times a Day.   Dose:  400 Units                        clonidine 0.1 MG Tabs   Commonly known as:  CATAPRES        Take 1 Tab by mouth as needed (Elevated BP >160; up to TID).   Dose:  0.1 mg                        diltiazem  MG Cp24   Commonly known as:  CARDIZEM CD        Take 2 Caps by mouth every day.   Dose:  240 mg                        FOSAMAX PO        Take  by mouth.                        insulin aspart 100 UNIT/ML Soln   Commonly known as:  NOVOLOG        Inject  as instructed 3 times a day before meals.                        insulin glargine 100 UNIT/ML Soln   Commonly known as:  LANTUS        Inject 2-5 Units as instructed 2 Times a Day. Based on carbs.   Dose:  2-5 Units                        MAGNESIUM PO        Take  by mouth.                        metformin 500 MG Tabs   Commonly known as:  GLUCOPHAGE        Take 500 mg by mouth every day.   Dose:  500 mg                        metoprolol 100 MG Tabs   Commonly known as:  LOPRESSOR        Take 1 Tab by mouth 2 times a day.   Dose:  100 mg                        oxycodone-acetaminophen 5-325 MG Tabs   Commonly known as:  PERCOCET        Take 1-2 Tabs by mouth every four hours as needed.   Dose:  1-2 Tab                        VALIUM 10 MG tablet   Generic drug:  diazepam        Take 10 mg by mouth every bedtime.   Dose:  10  mg                        VITAMIN B COMPLEX PO        Take 1 Tab by mouth every day.   Dose:  1 Tab                             Where to Get Your Medications      You can get these medications from any pharmacy     Bring a paper prescription for each of these medications    - amoxicillin 500 MG Caps            Procedures and tests performed during your visit     Procedure/Test Number of Times Performed    ACCU-CHEK GLUCOSE 1    BLOOD CULTURE 2    CARDIAC MONITORING 1    CBC WITH DIFFERENTIAL 1    COMP METABOLIC PANEL 1    DX-CHEST-2 VIEWS 1    ESTIMATED GFR 1    IV Saline Lock 1    Lactic acid (lactate) 2    OXYGEN THERAPY PER PROTOCOL 1    RAPID STREP, CULT IF INDICATED (CULTURE IF NEGATIVE) 1        Discharge Instructions       Sore Throat  A sore throat is pain, burning, irritation, or scratchiness of the throat. There is often pain or tenderness when swallowing or talking. A sore throat may be accompanied by other symptoms, such as coughing, sneezing, fever, and swollen neck glands. A sore throat is often the first sign of another sickness, such as a cold, flu, strep throat, or mononucleosis (commonly known as mono). Most sore throats go away without medical treatment.  CAUSES   The most common causes of a sore throat include:  · A viral infection, such as a cold, flu, or mono.  · A bacterial infection, such as strep throat, tonsillitis, or whooping cough.  · Seasonal allergies.  · Dryness in the air.  · Irritants, such as smoke or pollution.  · Gastroesophageal reflux disease (GERD).  HOME CARE INSTRUCTIONS   · Only take over-the-counter medicines as directed by your caregiver.  · Drink enough fluids to keep your urine clear or pale yellow.  · Rest as needed.  · Try using throat sprays, lozenges, or sucking on hard candy to ease any pain (if older than 4 years or as directed).  · Sip warm liquids, such as broth, herbal tea, or warm water with honey to relieve pain temporarily. You may also eat or drink cold or  "frozen liquids such as frozen ice pops.  · Gargle with salt water (mix 1 tsp salt with 8 oz of water).  · Do not smoke and avoid secondhand smoke.  · Put a cool-mist humidifier in your bedroom at night to moisten the air. You can also turn on a hot shower and sit in the bathroom with the door closed for 5-10 minutes.  SEEK IMMEDIATE MEDICAL CARE IF:  · You have difficulty breathing.  · You are unable to swallow fluids, soft foods, or your saliva.  · You have increased swelling in the throat.  · Your sore throat does not get better in 7 days.  · You have nausea and vomiting.  · You have a fever or persistent symptoms for more than 2-3 days.  · You have a fever and your symptoms suddenly get worse.  MAKE SURE YOU:   · Understand these instructions.  · Will watch your condition.  · Will get help right away if you are not doing well or get worse.     This information is not intended to replace advice given to you by your health care provider. Make sure you discuss any questions you have with your health care provider.     Document Released: 01/25/2006 Document Revised: 01/08/2016 Document Reviewed: 08/25/2013  [employer] Interactive Patient Education ©2016 [employer]      Strep Throat  Strep throat is an infection of the throat caused by a bacteria named Streptococcus pyogenes. Your health care provider may call the infection streptococcal \"tonsillitis\" or \"pharyngitis\" depending on whether there are signs of inflammation in the tonsils or back of the throat. Strep throat is most common in children aged 5-15 years during the cold months of the year, but it can occur in people of any age during any season. This infection is spread from person to person (contagious) through coughing, sneezing, or other close contact.  SIGNS AND SYMPTOMS   · Fever or chills.  · Painful, swollen, red tonsils or throat.  · Pain or difficulty when swallowing.  · White or yellow spots on the tonsils or throat.  · Swollen, tender lymph nodes " "or \"glands\" of the neck or under the jaw.  · Red rash all over the body (rare).  DIAGNOSIS   Many different infections can cause the same symptoms. A test must be done to confirm the diagnosis so the right treatment can be given. A \"rapid strep test\" can help your health care provider make the diagnosis in a few minutes. If this test is not available, a light swab of the infected area can be used for a throat culture test. If a throat culture test is done, results are usually available in a day or two.  TREATMENT   Strep throat is treated with antibiotic medicine.  HOME CARE INSTRUCTIONS   · Gargle with 1 tsp of salt in 1 cup of warm water, 3-4 times per day or as needed for comfort.  · Family members who also have a sore throat or fever should be tested for strep throat and treated with antibiotics if they have the strep infection.  · Make sure everyone in your household washes their hands well.  · Do not share food, drinking cups, or personal items that could cause the infection to spread to others.  · You may need to eat a soft food diet until your sore throat gets better.  · Drink enough water and fluids to keep your urine clear or pale yellow. This will help prevent dehydration.  · Get plenty of rest.  · Stay home from school, day care, or work until you have been on antibiotics for 24 hours.  · Take medicines only as directed by your health care provider.  · Take your antibiotic medicine as directed by your health care provider. Finish it even if you start to feel better.  SEEK MEDICAL CARE IF:   · The glands in your neck continue to enlarge.  · You develop a rash, cough, or earache.  · You cough up green, yellow-brown, or bloody sputum.  · You have pain or discomfort not controlled by medicines.  · Your problems seem to be getting worse rather than better.  · You have a fever.  SEEK IMMEDIATE MEDICAL CARE IF:   · You develop any new symptoms such as vomiting, severe headache, stiff or painful neck, chest " pain, shortness of breath, or trouble swallowing.  · You develop severe throat pain, drooling, or changes in your voice.  · You develop swelling of the neck, or the skin on the neck becomes red and tender.  · You develop signs of dehydration, such as fatigue, dry mouth, and decreased urination.  · You become increasingly sleepy, or you cannot wake up completely.  MAKE SURE YOU:  · Understand these instructions.  · Will watch your condition.  · Will get help right away if you are not doing well or get worse.     This information is not intended to replace advice given to you by your health care provider. Make sure you discuss any questions you have with your health care provider.     Document Released: 12/15/2001 Document Revised: 01/08/2016 Document Reviewed: 04/11/2016  [employer] Interactive Patient Education ©2016 [employer]              Patient Information     Patient Information    Following emergency treatment: all patient requiring follow-up care must return either to a private physician or a clinic if your condition worsens before you are able to obtain further medical attention, please return to the emergency room.     Billing Information    At [facility], we work to make the billing process streamlined for our patients.  Our Representatives are here to answer any questions you may have regarding your hospital bill.  If you have insurance coverage and have supplied your insurance information to us, we will submit a claim to your insurer on your behalf.  Should you have any questions regarding your bill, we can be reached online or by phone as follows:  Online: You are able pay your bills online or live chat with our representatives about any billing questions you may have. We are here to help Monday - Friday from 8:00am to 7:30pm and 9:00am - 12:00pm on Saturdays.  Please visit [email or web address]  for more information.   Phone:  [phone] or [phone]    Please  note that your emergency physician, surgeon, pathologist, radiologist, anesthesiologist, and other specialists are not employed by [facility] and will therefore bill separately for their services.  Please contact them directly for any questions concerning their bills at the numbers below:     Emergency Physician Services:  [phone]  [facility]:  [phone]  [facility]:  [phone]  [facility]:  [phone]    1. Your final bill may vary from the amount quoted upon discharge if all procedures are not complete at that time, or if your doctor has additional procedures of which we are not aware. You will receive an additional bill if you return to the Emergency Department at [facility] for suture removal regardless of the facility of which the sutures were placed.     2. Please arrange for settlement of this account at the emergency registration.    3. All self-pay accounts are due in full at the time of treatment.  If you are unable to meet this obligation then payment is expected within 4-5 days.     4. If you have had radiology studies (CT, X-ray, Ultrasound, MRI), you have received a preliminary result during your emergency department visit. Please contact the radiology department [phone] to receive a copy of your final result. Please discuss the Final result with your primary physician or with the follow up physician provided.     Crisis Hotline:  [place] Crisis Hotline:  2-299-LGWHMNY or 1-896.152.9212  [place] Crisis Hotline:    1-170.300.5313 or 511-302-0474         ED Discharge Follow Up Questions    1. In order to provide you with very good care, we would like to follow up with a phone call in the next few days.  May we have your permission to contact you?     YES /  NO    2. What is the best phone number to call you? (       )_____-__________    3. What is the best time to call you?      Morning  /  Afternoon  /  Evening                    Patient Signature:  ____________________________________________________________    Date:  ____________________________________________________________      Your appointments     Mar 29, 2017  3:40 PM   FOLLOW UP with TASHA Arnold   [facility] (--)    [address] [place] [id number] [phone]

## 2017-03-15 NOTE — ED NOTES
C/o cough, barky,nonproductive, fever chills, sepsis score of 3, charge aware, seen at [facility] ' did a nose swab and chest xray and sent me home'

## 2017-03-15 NOTE — ED NOTES
Blood draw performed on pt. Pt tearful requesting to lay down over and over. Pt states she is worried about low blood sugar as she is diabetic. Glucometer check read 189. Pt assisted to chair in waiting room.

## 2017-03-16 NOTE — ED NOTES
Patient ambulatory to [facility], chart up for ERP, left AMA from [facility] yesterday.

## 2017-03-16 NOTE — ED NOTES
Gokul from Lab called with critical result of Strep at +. Critical lab result read back to Gokul.   Dr. Raymond notified of critical lab result at 1853.  Critical lab result read back by  1853.

## 2017-03-16 NOTE — ED PROVIDER NOTES
ED Provider Note    Scribed for Sima Raymond M.D. and resident Dr. Julio C NETTLES, by Jerod Mayer. 3/15/2017, 5:19 PM.    Primary care provider: Madonna Jaimes M.D.  Means of arrival: Walk-in  History obtained from: Patient  History limited by: None    CHIEF COMPLAINT  No chief complaint on file.      HPI  Kizzy Chapman is a 56 y.o. female who presents to the Emergency Department with with cough, fever, sore throat, and headache. Patient reports she has had a dry cough and wheezing for 4 days. Her cough has been worsening since last night with associated chest wall pain worsened with coughing. Patient also endorses fever, chills, sore throat, and headache for the last few days. She tells me she was evaluated at [facility] yesterday and had an influenza swab and chest xray, but left against medical advice before these tests resulted. She denies abdominal pain, nausea, vomiting, diarrhea, shortness of breath, or other symptoms. Her family member currently has strep pharyngitis and another family member has pneumonia. No history of recent travel. Patient did not receive flu vaccine this year. Patient is asking for antibiotics.     REVIEW OF SYSTEMS  Pertinent positives include cough, fever, sore throat, headache, wheezing, chills, Myalgias, chest wall pain. Pertinent negatives include no abdominal pain, nausea, vomiting, diarrhea, shortness of breath, sputum production.  All other systems reviewed and negative. C.    PAST MEDICAL HISTORY   has a past medical history of Anxiety; Hypertension; Hypercholesterolemia; Psychiatric disorder; Hypertrophic obstructive cardiomyopathy(425.11) (4/1/2015); Diabetes (CMS-[facility]); and Obesity.    SURGICAL HISTORY   has past surgical history that includes other cardiac surgery; other orthopedic surgery; and aicd implant.    SOCIAL HISTORY  Social History   Substance Use Topics   • Smoking status: Former Smoker -- 0.10 packs/day for 10 years     Types: Cigarettes     Quit  "date: 04/27/2015   • Smokeless tobacco: Never Used   • Alcohol Use: Yes      Comment: occasional      History   Drug Use No       FAMILY HISTORY  No contributing family history noted.     CURRENT MEDICATIONS  Reviewed.  See Encounter Summary.     ALLERGIES  Allergies   Allergen Reactions   • Epinephrine      Tightness in arms and legs       PHYSICAL EXAM  VITAL SIGNS: /79 mmHg  Pulse 99  Temp(Src) 39.1 °C (102.3 °F) (Temporal)  Resp 20  Ht 1.6 m (5' 3\")  Wt 81 kg (178 lb 9.2 oz)  BMI 31.64 kg/m2  SpO2 92%    Constitutional: Alert. Tearful at times, anxious.   HENT: No signs of trauma, Bilateral external ears normal, Nose normal. Posterior oropharynx with mild erythema, no exudates, no tonsillar edema.   Eyes: Pupils are equal and reactive, Conjunctiva normal, Non-icteric.   Neck: No cervical lymphadenopathy. Normal range of motion, No tenderness, Supple, No stridor.   Cardiovascular: Tachycardic rate and rhythm, no murmurs.   Thorax & Lungs: Normal breath sounds, No respiratory distress, No wheezing, No chest tenderness.   Abdomen: Bowel sounds normal, Soft, No tenderness, No masses, No peritoneal signs.  Skin: Warm, Dry, No erythema, No rash.   Musculoskeletal: No major deformities noted.   Neurologic: Alert, moving all extremities without difficulty, no focal deficits.    LABS  Results for orders placed or performed during the hospital encounter of 03/15/17   Lactic acid (lactate)   Result Value Ref Range    Lactic Acid 1.8 0.5 - 2.0 mmol/L   Lactic acid (lactate)   Result Value Ref Range    Lactic Acid 1.4 0.5 - 2.0 mmol/L   CBC WITH DIFFERENTIAL   Result Value Ref Range    WBC 7.8 4.8 - 10.8 K/uL    RBC 4.83 4.20 - 5.40 M/uL    Hemoglobin 15.0 12.0 - 16.0 g/dL    Hematocrit 43.5 37.0 - 47.0 %    MCV 90.1 81.4 - 97.8 fL    MCH 31.1 27.0 - 33.0 pg    MCHC 34.5 33.6 - 35.0 g/dL    RDW 38.8 35.9 - 50.0 fL    Platelet Count 262 164 - 446 K/uL    MPV 9.2 9.0 - 12.9 fL    Neutrophils-Polys 67.30 44.00 - " 72.00 %    Lymphocytes 15.80 (L) 22.00 - 41.00 %    Monocytes 15.80 (H) 0.00 - 13.40 %    Eosinophils 0.30 0.00 - 6.90 %    Basophils 0.50 0.00 - 1.80 %    Immature Granulocytes 0.30 0.00 - 0.90 %    Nucleated RBC 0.00 /100 WBC    Neutrophils (Absolute) 5.26 2.00 - 7.15 K/uL    Lymphs (Absolute) 1.23 1.00 - 4.80 K/uL    Monos (Absolute) 1.23 (H) 0.00 - 0.85 K/uL    Eos (Absolute) 0.02 0.00 - 0.51 K/uL    Baso (Absolute) 0.04 0.00 - 0.12 K/uL    Immature Granulocytes (abs) 0.02 0.00 - 0.11 K/uL    NRBC (Absolute) 0.00 K/uL   COMP METABOLIC PANEL   Result Value Ref Range    Sodium 132 (L) 135 - 145 mmol/L    Potassium 4.4 3.6 - 5.5 mmol/L    Chloride 99 96 - 112 mmol/L    Co2 21 20 - 33 mmol/L    Anion Gap 12.0 (H) 0.0 - 11.9    Glucose 202 (H) 65 - 99 mg/dL    Bun 8 8 - 22 mg/dL    Creatinine 0.67 0.50 - 1.40 mg/dL    Calcium 9.4 8.5 - 10.5 mg/dL    AST(SGOT) 24 12 - 45 U/L    ALT(SGPT) 36 2 - 50 U/L    Alkaline Phosphatase 91 30 - 99 U/L    Total Bilirubin 0.4 0.1 - 1.5 mg/dL    Albumin 4.2 3.2 - 4.9 g/dL    Total Protein 7.3 6.0 - 8.2 g/dL    Globulin 3.1 1.9 - 3.5 g/dL    A-G Ratio 1.4 g/dL   ESTIMATED GFR   Result Value Ref Range    GFR If African American >60 >60 mL/min/1.73 m 2    GFR If Non African American >60 >60 mL/min/1.73 m 2   RAPID STREP, CULT IF INDICATED (CULTURE IF NEGATIVE)   Result Value Ref Range    Significant Indicator POS (POS)     Source THRT     Site THROAT     Rapid Strep Screen Positive for Group A streptococcus. (A)    ACCU-CHEK GLUCOSE   Result Value Ref Range    Glucose - Accu-Ck 189 (H) 65 - 99 mg/dL      All labs reviewed by me.    RADIOLOGY  DX-CHEST-2 VIEWS   Final Result      1.  Minimal LEFT mid to lower lung atelectasis.   2.  No pneumonia or pneumothorax.        The radiologist's interpretation of all radiological studies have been reviewed by me.    COURSE & MEDICAL DECISION MAKING  Pertinent Labs & Imaging studies reviewed. (See chart for details)    Differential diagnoses  include but are not limited to: viral URI, strep pharyngitis, pneumonia    5:19 PM - Patient seen and examined at bedside by resident Dr. Bunch. Patient made aware labs will be ordered to evaluate, including strep and influenza. She was made aware her chest xray will be repeated and and her symptoms will be treated.  Patient will be treated with Toradol 30 mg IV. Ordered DX-chest, rapid strep, lactic acid, eGFR, accu-chek glucose, CBC, CMP, urinalysis, urine culture, blood culture to evaluate her symptoms.     7:01 PM Recheck: Patient reports her headache has not resolved. Patient was updated on her results and made aware she is positive for strep infection. I explained to the patient she will be treated with Tylenol for her fever in the ED and she will be discharged home with antibiotics. She will follow up with her primary care provider and will return to the ED for high fever not relieved by medication, worsening symptoms, or other medical concerns. Patient will be treated with Unasyn 3 g IV and Tylenol 650 mg PO.    Decision Making:  This is a 56 y.o. year old female who presents with cough, sore throat, and fever for the last four days. Was recently seen in [facility] and tests including CXR and nasal swab for influenza were negative per patient. Will obtain records from above facility. Labs showing no significant abnormalities including LA. Rapid strept test positive. Patient treated with a dose of Unasyn and tylenol and will be discharged on amoxicillin in stable condition.    Patient will return to ED in case of worsening or persistence of her symptoms.    he patient will return for new or worsening symptoms and is stable at the time of discharge.    The patient is referred to a primary physician for blood pressure management, diabetic screening, and for all other preventative health concerns.    DISPOSITION:  Patient will be discharged home in stable condition.    FOLLOW UP:  Madonna  CHANDANA Jaimes M.D.  [address]  [address]  [place] [address]  [phone]          [facility], Emergency Dept  [address] [address] [phone]    Return for worsening pain, fever, difficulty breathing or other concerns      OUTPATIENT MEDICATIONS:  New Prescriptions    AMOXICILLIN (AMOXIL) 500 MG CAP    Take 1 Cap by mouth 3 times a day.       FINAL IMPRESSION  Streptococcal pharyngitis    1. Strep throat    2. Fever, unspecified fever cause          This dictation has been created using voice recognition software and/or scribes. The accuracy of the dictation is limited by the abilities of the software and the expertise of the scribes. I expect there may be some errors of grammar and possibly content. I made every attempt to manually correct the errors within my dictation. However, errors related to voice recognition software and/or scribes may still exist and should be interpreted within the appropriate context.     I, Jerod Mayer (Scribe), am scribing for, and in the presence of, Sima Raymond M.D. and resident Dr. Julio C HERNANDEZ    Electronically signed by: Jerod Mayer (Jeremiahibalicia), 3/15/2017    ISima M.D. personally performed the services described in this documentation, as scribed by Jerod Mayer in my presence, and it is both accurate and complete.    The note accurately reflects work and decisions made by me.  Sima Raymond  3/15/2017  11:42 PM

## 2017-03-16 NOTE — DISCHARGE INSTRUCTIONS
Sore Throat  A sore throat is pain, burning, irritation, or scratchiness of the throat. There is often pain or tenderness when swallowing or talking. A sore throat may be accompanied by other symptoms, such as coughing, sneezing, fever, and swollen neck glands. A sore throat is often the first sign of another sickness, such as a cold, flu, strep throat, or mononucleosis (commonly known as mono). Most sore throats go away without medical treatment.  CAUSES   The most common causes of a sore throat include:  · A viral infection, such as a cold, flu, or mono.  · A bacterial infection, such as strep throat, tonsillitis, or whooping cough.  · Seasonal allergies.  · Dryness in the air.  · Irritants, such as smoke or pollution.  · Gastroesophageal reflux disease (GERD).  HOME CARE INSTRUCTIONS   · Only take over-the-counter medicines as directed by your caregiver.  · Drink enough fluids to keep your urine clear or pale yellow.  · Rest as needed.  · Try using throat sprays, lozenges, or sucking on hard candy to ease any pain (if older than 4 years or as directed).  · Sip warm liquids, such as broth, herbal tea, or warm water with honey to relieve pain temporarily. You may also eat or drink cold or frozen liquids such as frozen ice pops.  · Gargle with salt water (mix 1 tsp salt with 8 oz of water).  · Do not smoke and avoid secondhand smoke.  · Put a cool-mist humidifier in your bedroom at night to moisten the air. You can also turn on a hot shower and sit in the bathroom with the door closed for 5-10 minutes.  SEEK IMMEDIATE MEDICAL CARE IF:  · You have difficulty breathing.  · You are unable to swallow fluids, soft foods, or your saliva.  · You have increased swelling in the throat.  · Your sore throat does not get better in 7 days.  · You have nausea and vomiting.  · You have a fever or persistent symptoms for more than 2-3 days.  · You have a fever and your symptoms suddenly get worse.  MAKE SURE YOU:   · Understand  "these instructions.  · Will watch your condition.  · Will get help right away if you are not doing well or get worse.     This information is not intended to replace advice given to you by your health care provider. Make sure you discuss any questions you have with your health care provider.     Document Released: 01/25/2006 Document Revised: 01/08/2016 Document Reviewed: 08/25/2013  [employer] Interactive Patient Education ©2016 [employer]      Strep Throat  Strep throat is an infection of the throat caused by a bacteria named Streptococcus pyogenes. Your health care provider may call the infection streptococcal \"tonsillitis\" or \"pharyngitis\" depending on whether there are signs of inflammation in the tonsils or back of the throat. Strep throat is most common in children aged 5-15 years during the cold months of the year, but it can occur in people of any age during any season. This infection is spread from person to person (contagious) through coughing, sneezing, or other close contact.  SIGNS AND SYMPTOMS   · Fever or chills.  · Painful, swollen, red tonsils or throat.  · Pain or difficulty when swallowing.  · White or yellow spots on the tonsils or throat.  · Swollen, tender lymph nodes or \"glands\" of the neck or under the jaw.  · Red rash all over the body (rare).  DIAGNOSIS   Many different infections can cause the same symptoms. A test must be done to confirm the diagnosis so the right treatment can be given. A \"rapid strep test\" can help your health care provider make the diagnosis in a few minutes. If this test is not available, a light swab of the infected area can be used for a throat culture test. If a throat culture test is done, results are usually available in a day or two.  TREATMENT   Strep throat is treated with antibiotic medicine.  HOME CARE INSTRUCTIONS   · Gargle with 1 tsp of salt in 1 cup of warm water, 3-4 times per day or as needed for comfort.  · Family members who also have a sore throat " or fever should be tested for strep throat and treated with antibiotics if they have the strep infection.  · Make sure everyone in your household washes their hands well.  · Do not share food, drinking cups, or personal items that could cause the infection to spread to others.  · You may need to eat a soft food diet until your sore throat gets better.  · Drink enough water and fluids to keep your urine clear or pale yellow. This will help prevent dehydration.  · Get plenty of rest.  · Stay home from school, day care, or work until you have been on antibiotics for 24 hours.  · Take medicines only as directed by your health care provider.  · Take your antibiotic medicine as directed by your health care provider. Finish it even if you start to feel better.  SEEK MEDICAL CARE IF:   · The glands in your neck continue to enlarge.  · You develop a rash, cough, or earache.  · You cough up green, yellow-brown, or bloody sputum.  · You have pain or discomfort not controlled by medicines.  · Your problems seem to be getting worse rather than better.  · You have a fever.  SEEK IMMEDIATE MEDICAL CARE IF:   · You develop any new symptoms such as vomiting, severe headache, stiff or painful neck, chest pain, shortness of breath, or trouble swallowing.  · You develop severe throat pain, drooling, or changes in your voice.  · You develop swelling of the neck, or the skin on the neck becomes red and tender.  · You develop signs of dehydration, such as fatigue, dry mouth, and decreased urination.  · You become increasingly sleepy, or you cannot wake up completely.  MAKE SURE YOU:  · Understand these instructions.  · Will watch your condition.  · Will get help right away if you are not doing well or get worse.     This information is not intended to replace advice given to you by your health care provider. Make sure you discuss any questions you have with your health care provider.     Document Released: 12/15/2001 Document Revised:  01/08/2016 Document Reviewed: 04/11/2016  Elsevier Interactive Patient Education ©2016 [employer] Inc.

## 2017-03-16 NOTE — ED NOTES
Pt given discharge and follow up instructions and prescriptions, all questions answered, instructed on proper antibiotic use, pt verbalized understanding. Pt discharged with self. Copy of discharge provided to pt. Signed copy in chart.  Pt states that all personal belongings are in possession. Pt ambulatory off unit.

## 2017-03-20 LAB
BACTERIA BLD CULT: NORMAL
BACTERIA BLD CULT: NORMAL
SIGNIFICANT IND 70042: NORMAL
SIGNIFICANT IND 70042: NORMAL
SITE SITE: NORMAL
SITE SITE: NORMAL
SOURCE SOURCE: NORMAL
SOURCE SOURCE: NORMAL

## 2017-04-21 ENCOUNTER — HOSPITAL ENCOUNTER (OUTPATIENT)
Dept: CARDIOLOGY | Facility: MEDICAL CENTER | Age: 57
End: 2017-04-21
Attending: NURSE PRACTITIONER | Admitting: NURSE PRACTITIONER
Payer: MEDICARE

## 2017-04-21 DIAGNOSIS — I10 ESSENTIAL HYPERTENSION: ICD-10-CM

## 2017-04-21 DIAGNOSIS — E78.00 PURE HYPERCHOLESTEROLEMIA: ICD-10-CM

## 2017-04-21 DIAGNOSIS — I42.1 HYPERTROPHIC OBSTRUCTIVE CARDIOMYOPATHY (HCC): ICD-10-CM

## 2017-04-21 PROCEDURE — 93306 TTE W/DOPPLER COMPLETE: CPT

## 2017-04-21 PROCEDURE — 93306 TTE W/DOPPLER COMPLETE: CPT | Mod: 26 | Performed by: INTERNAL MEDICINE

## 2017-04-23 LAB
LV EJECT FRACT  99904: 70
LV EJECT FRACT MOD 2C 99903: 65.7
LV EJECT FRACT MOD 4C 99902: 67.46
LV EJECT FRACT MOD BP 99901: 66.37

## 2017-05-11 ENCOUNTER — OFFICE VISIT (OUTPATIENT)
Dept: CARDIOLOGY | Facility: MEDICAL CENTER | Age: 57
End: 2017-05-11
Payer: MEDICARE

## 2017-05-11 VITALS
DIASTOLIC BLOOD PRESSURE: 72 MMHG | SYSTOLIC BLOOD PRESSURE: 136 MMHG | BODY MASS INDEX: 28.56 KG/M2 | HEIGHT: 67 IN | HEART RATE: 66 BPM | WEIGHT: 182 LBS | OXYGEN SATURATION: 94 %

## 2017-05-11 DIAGNOSIS — I42.1 HYPERTROPHIC OBSTRUCTIVE CARDIOMYOPATHY(425.11): ICD-10-CM

## 2017-05-11 DIAGNOSIS — E66.9 DIABETES MELLITUS TYPE 2 IN OBESE (HCC): ICD-10-CM

## 2017-05-11 DIAGNOSIS — E78.2 MIXED HYPERLIPIDEMIA: ICD-10-CM

## 2017-05-11 DIAGNOSIS — E11.69 DIABETES MELLITUS TYPE 2 IN OBESE (HCC): ICD-10-CM

## 2017-05-11 DIAGNOSIS — I10 ESSENTIAL HYPERTENSION: ICD-10-CM

## 2017-05-11 PROCEDURE — 3014F SCREEN MAMMO DOC REV: CPT | Mod: 8P | Performed by: NURSE PRACTITIONER

## 2017-05-11 PROCEDURE — G8432 DEP SCR NOT DOC, RNG: HCPCS | Performed by: NURSE PRACTITIONER

## 2017-05-11 PROCEDURE — 1036F TOBACCO NON-USER: CPT | Performed by: NURSE PRACTITIONER

## 2017-05-11 PROCEDURE — G8417 CALC BMI ABV UP PARAM F/U: HCPCS | Performed by: NURSE PRACTITIONER

## 2017-05-11 PROCEDURE — 3017F COLORECTAL CA SCREEN DOC REV: CPT | Mod: 8P | Performed by: NURSE PRACTITIONER

## 2017-05-11 PROCEDURE — 3046F HEMOGLOBIN A1C LEVEL >9.0%: CPT | Performed by: NURSE PRACTITIONER

## 2017-05-11 PROCEDURE — 99214 OFFICE O/P EST MOD 30 MIN: CPT | Performed by: NURSE PRACTITIONER

## 2017-05-11 ASSESSMENT — ENCOUNTER SYMPTOMS
CLAUDICATION: 0
DIZZINESS: 0
WEAKNESS: 0
ABDOMINAL PAIN: 0
PND: 0
MYALGIAS: 0
ORTHOPNEA: 0
SHORTNESS OF BREATH: 0
PALPITATIONS: 0
COUGH: 0

## 2017-05-11 NOTE — PROGRESS NOTES
Subjective:   Kizzy Chapman is a 56 y.o. female who presents today to follow-up on hypertrophic cardiomyopathy. She was last seen in January at which time echocardiogram was ordered. She is here for follow-up.    She has diabetes and is on insulin currently. She has a history of WPW and underwent ablation in 1997. She has hypertension, hyperlipidemia and a right bundle branch block.    She states she feels generally well. She denies any chest tightness, heaviness or pressure. No palpitations. She breathes hard when she exerts herself but denies any orthopnea or PND.    She has been walking at the [employer] several times a week and tolerates it well. She has changed her diet and is working on losing weight. She quit smoking a few years ago. She has reduced alcohol to one day per week.    Past Medical History   Diagnosis Date   • Anxiety    • Hypertension    • Hypercholesterolemia    • Psychiatric disorder    • Hypertrophic obstructive cardiomyopathy (CMS-HCC) 4/1/2015   • Diabetes (CMS-HCC)    • Obesity    • Hypertrophic cardiomyopathy (CMS-HCC)      Past Surgical History   Procedure Laterality Date   • Other cardiac surgery       ablation of heart for WPW   • Other orthopedic surgery       tib fib with pins; left total hip replacement     History reviewed. No pertinent family history.  History   Smoking status   • Former Smoker -- 0.10 packs/day for 10 years   • Types: Cigarettes   • Quit date: 04/27/2015   Smokeless tobacco   • Never Used     Allergies   Allergen Reactions   • Epinephrine      Tightness in arms and legs     Outpatient Encounter Prescriptions as of 5/11/2017   Medication Sig Dispense Refill   • atorvastatin (LIPITOR) 80 MG tablet Take 1 Tab by mouth every day. 90 Tab 3   • metoprolol (LOPRESSOR) 100 MG Tab Take 1 Tab by mouth 2 times a day. 180 Tab 3   • diltiazem CD (CARDIZEM CD) 120 MG CAPSULE SR 24 HR Take 2 Caps by mouth every day. 180 Cap 3   • insulin aspart (NOVOLOG) 100 UNIT/ML  "Solution Inject  as instructed 3 times a day before meals.     • Cholecalciferol (VITAMIN D) 400 UNIT Tab Take 400 Units by mouth 2 Times a Day.     • metformin (GLUCOPHAGE) 500 MG Tab Take 500 mg by mouth 2 times a day.     • Alendronate Sodium (FOSAMAX PO) Take  by mouth.     • clonidine (CATAPRES) 0.1 MG Tab Take 1 Tab by mouth as needed (Elevated BP >160; up to TID). 90 Tab 3   • MAGNESIUM PO Take  by mouth.     • insulin glargine (LANTUS) 100 UNIT/ML SOLN Inject 2-5 Units as instructed 2 Times a Day. Based on carbs.     • Calcium Carbonate-Vitamin D (CALCIUM + D PO) Take 1 Tab by mouth 2 Times a Day.     • B Complex Vitamins (VITAMIN B COMPLEX PO) Take 1 Tab by mouth every day.     • diazepam (VALIUM) 10 MG tablet Take 10 mg by mouth every bedtime.     • [DISCONTINUED] amoxicillin (AMOXIL) 500 MG Cap Take 1 Cap by mouth 3 times a day. (Patient not taking: Reported on 5/11/2017) 30 Cap 0   • oxycodone-acetaminophen (PERCOCET) 5-325 MG Tab Take 1-2 Tabs by mouth every four hours as needed. Indications: not taking       No facility-administered encounter medications on file as of 5/11/2017.     Review of Systems   Constitutional: Negative for malaise/fatigue.   Respiratory: Negative for cough and shortness of breath.    Cardiovascular: Positive for leg swelling (rare.). Negative for chest pain, palpitations, orthopnea, claudication and PND.   Gastrointestinal: Negative for abdominal pain.   Musculoskeletal: Negative for myalgias.   Neurological: Negative for dizziness and weakness.        Objective:   /72 mmHg  Pulse 66  Ht 1.69 m (5' 6.53\")  Wt 82.555 kg (182 lb)  BMI 28.90 kg/m2  SpO2 94%    Physical Exam   Constitutional: She is oriented to person, place, and time. She appears well-developed and well-nourished.   HENT:   Head: Normocephalic.   Eyes: Conjunctivae are normal.   Neck: No JVD present. No thyromegaly present.   Cardiovascular: Normal rate, regular rhythm, S1 normal, S2 normal and normal " heart sounds.  Exam reveals no gallop, no S3, no S4 and no friction rub.    No murmur heard.  Pulmonary/Chest: Effort normal and breath sounds normal. No respiratory distress. She has no wheezes. She has no rales.   Abdominal: Soft. Bowel sounds are normal. She exhibits no distension. There is no tenderness.   Musculoskeletal: She exhibits edema (trace bilateral.).   Neurological: She is alert and oriented to person, place, and time.   Skin: Skin is warm and dry.   Psychiatric: She has a normal mood and affect.     May 10, 2017: Hypertensive metabolic panel is within normal limits with the exception of fasting glucose at 171 and calcium 10.4. Lipids: Cholesterol 183, triglycerides 282, HDL 44, LDL 83. TSH 2.0.      April 21st 2017: Transthoracic Echo Report:    Normal left ventricular systolic function.  Mild concentric left ventricular hypertrophy.  LVOT gradient (HOCM) with a peak of 20 mmHg.  Aortic sclerosis without stenosis.  Compared to the images of the prior study done -  left ventricular wall   thickness appears marginally better, otherwise the study is unchanged.      Assessment:     1. Hypertrophic obstructive cardiomyopathy (CMS-[facility])     2. Essential hypertension     3. Mixed hyperlipidemia     4. Diabetes mellitus type 2 in obese (CMS-[facility])         Medical Decision Making:  Today's Assessment / Status / Plan:     Hypertrophic cardiomyopathy: Her echo actually looks better. As her left ventricular hypertrophy has reduced slightly. I do not hear a murmur on exam today. She has no near syncope. She does not have any significant shortness of breath. She appears to be asymptomatic from this. We will follow her expectantly with echoes.    Hypertension: Her blood pressure is good in the office today. She will continue on her current medications and limits sodium in her diet.        Hyperlipidemia: Atorvastatin is working well for her. Triglycerides worse high as 500 previously and have come down significantly.  She will continue to work on diet and diabetic control. She will increase her exercise to 30 minutes 5 days a week.    Diabetes: Followed by Rosita shell at [facility]. She will continue to work on better diabetic control. If she were to need one of the new medications such as Invocana, she would be a candidate for this. Patient tells me she does not want to take one of these medicines. I expect that her diabetes will come into better control with her diet and lifestyle.    She is stable enough from a cardiovascular standpoint to follow up in 6 months with Dr. Arriaga.  Sooner if problems or contact us using my chart.    Collaborating Provider: Dr Guerra.

## 2017-05-11 NOTE — Clinical Note
[facility]   [address], [facility] [address]  [place] [address]  Phone: [phone]  Fax: [phone]              Kizzy Chapman  1960    Encounter Date: 5/11/2017    WAYNE Keating          PROGRESS NOTE:  Subjective:   Kizzy Chapman is a 56 y.o. female who presents today to follow-up on hypertrophic cardiomyopathy. She was last seen in January at which time echocardiogram was ordered. She is here for follow-up.    She has diabetes and is on insulin currently. She has a history of WPW and underwent ablation in 1997. She has hypertension, hyperlipidemia and a right bundle branch block.    She states she feels generally well. She denies any chest tightness, heaviness or pressure. No palpitations. She breathes hard when she exerts herself but denies any orthopnea or PND.    She has been walking at the [place] several times a week and tolerates it well. She has changed her diet and is working on losing weight. She quit smoking a few years ago. She has reduced alcohol to one day per week.    Past Medical History   Diagnosis Date   • Anxiety    • Hypertension    • Hypercholesterolemia    • Psychiatric disorder    • Hypertrophic obstructive cardiomyopathy (CMS-HCC) 4/1/2015   • Diabetes (CMS-HCC)    • Obesity    • Hypertrophic cardiomyopathy (CMS-HCC)      Past Surgical History   Procedure Laterality Date   • Other cardiac surgery       ablation of heart for WPW   • Other orthopedic surgery       tib fib with pins; left total hip replacement     History reviewed. No pertinent family history.  History   Smoking status   • Former Smoker -- 0.10 packs/day for 10 years   • Types: Cigarettes   • Quit date: 04/27/2015   Smokeless tobacco   • Never Used     Allergies   Allergen Reactions   • Epinephrine      Tightness in arms and legs     Outpatient Encounter Prescriptions as of 5/11/2017   Medication Sig Dispense Refill   • atorvastatin (LIPITOR) 80 MG tablet  "Take 1 Tab by mouth every day. 90 Tab 3   • metoprolol (LOPRESSOR) 100 MG Tab Take 1 Tab by mouth 2 times a day. 180 Tab 3   • diltiazem CD (CARDIZEM CD) 120 MG CAPSULE SR 24 HR Take 2 Caps by mouth every day. 180 Cap 3   • insulin aspart (NOVOLOG) 100 UNIT/ML Solution Inject  as instructed 3 times a day before meals.     • Cholecalciferol (VITAMIN D) 400 UNIT Tab Take 400 Units by mouth 2 Times a Day.     • metformin (GLUCOPHAGE) 500 MG Tab Take 500 mg by mouth 2 times a day.     • Alendronate Sodium (FOSAMAX PO) Take  by mouth.     • clonidine (CATAPRES) 0.1 MG Tab Take 1 Tab by mouth as needed (Elevated BP >160; up to TID). 90 Tab 3   • MAGNESIUM PO Take  by mouth.     • insulin glargine (LANTUS) 100 UNIT/ML SOLN Inject 2-5 Units as instructed 2 Times a Day. Based on carbs.     • Calcium Carbonate-Vitamin D (CALCIUM + D PO) Take 1 Tab by mouth 2 Times a Day.     • B Complex Vitamins (VITAMIN B COMPLEX PO) Take 1 Tab by mouth every day.     • diazepam (VALIUM) 10 MG tablet Take 10 mg by mouth every bedtime.     • [DISCONTINUED] amoxicillin (AMOXIL) 500 MG Cap Take 1 Cap by mouth 3 times a day. (Patient not taking: Reported on 5/11/2017) 30 Cap 0   • oxycodone-acetaminophen (PERCOCET) 5-325 MG Tab Take 1-2 Tabs by mouth every four hours as needed. Indications: not taking       No facility-administered encounter medications on file as of 5/11/2017.     Review of Systems   Constitutional: Negative for malaise/fatigue.   Respiratory: Negative for cough and shortness of breath.    Cardiovascular: Positive for leg swelling (rare.). Negative for chest pain, palpitations, orthopnea, claudication and PND.   Gastrointestinal: Negative for abdominal pain.   Musculoskeletal: Negative for myalgias.   Neurological: Negative for dizziness and weakness.        Objective:   /72 mmHg  Pulse 66  Ht 1.69 m (5' 6.53\")  Wt 82.555 kg (182 lb)  BMI 28.90 kg/m2  SpO2 94%    Physical Exam   Constitutional: She is oriented to " person, place, and time. She appears well-developed and well-nourished.   HENT:   Head: Normocephalic.   Eyes: Conjunctivae are normal.   Neck: No JVD present. No thyromegaly present.   Cardiovascular: Normal rate, regular rhythm, S1 normal, S2 normal and normal heart sounds.  Exam reveals no gallop, no S3, no S4 and no friction rub.    No murmur heard.  Pulmonary/Chest: Effort normal and breath sounds normal. No respiratory distress. She has no wheezes. She has no rales.   Abdominal: Soft. Bowel sounds are normal. She exhibits no distension. There is no tenderness.   Musculoskeletal: She exhibits edema (trace bilateral.).   Neurological: She is alert and oriented to person, place, and time.   Skin: Skin is warm and dry.   Psychiatric: She has a normal mood and affect.     May 10, 2017: Hypertensive metabolic panel is within normal limits with the exception of fasting glucose at 171 and calcium 10.4. Lipids: Cholesterol 183, triglycerides 282, HDL 44, LDL 83. TSH 2.0.      April 21st 2017: Transthoracic Echo Report:    Normal left ventricular systolic function.  Mild concentric left ventricular hypertrophy.  LVOT gradient (HOCM) with a peak of 20 mmHg.  Aortic sclerosis without stenosis.  Compared to the images of the prior study done -  left ventricular wall   thickness appears marginally better, otherwise the study is unchanged.      Assessment:     1. Hypertrophic obstructive cardiomyopathy (CMS-HCC)     2. Essential hypertension     3. Mixed hyperlipidemia     4. Diabetes mellitus type 2 in obese (CMS-HCC)         Medical Decision Making:  Today's Assessment / Status / Plan:     Hypertrophic cardiomyopathy: Her echo actually looks better. As her left ventricular hypertrophy has reduced slightly. I do not hear a murmur on exam today. She has no near syncope. She does not have any significant shortness of breath. She appears to be asymptomatic from this. We will follow her expectantly with echoes.    Hypertension:  Her blood pressure is good in the office today. She will continue on her current medications and limits sodium in her diet.        Hyperlipidemia: Atorvastatin is working well for her. Triglycerides worse high as 500 previously and have come down significantly. She will continue to work on diet and diabetic control. She will increase her exercise to 30 minutes 5 days a week.    Diabetes: Followed by Rosita Holley up at [facility]. She will continue to work on better diabetic control. If she were to need one of the new medications such as Invocana, she would be a candidate for this. Patient tells me she does not want to take one of these medicines. I expect that her diabetes will come into better control with her diet and lifestyle.    She is stable enough from a cardiovascular standpoint to follow up in 6 months with Dr. Arriaga.  Sooner if problems or contact us using my chart.    Collaborating Provider: Dr Guerra.        No Recipients

## 2017-05-11 NOTE — MR AVS SNAPSHOT
"        Kizzy Chapman   2017 8:40 AM   Office Visit   MRN: [id number]    Department:  [facility]   Dept Phone:  [phone]    Description:  Female : 1960   Provider:  WAYNE Keating           Reason for Visit     Follow-Up           Allergies as of 2017     Allergen Noted Reactions    Epinephrine 2015       Tightness in arms and legs      You were diagnosed with     Hypertrophic obstructive cardiomyopathy (CMS-HCC)   [425.11.ICD-9-CM]       Essential hypertension   [3831700]       Mixed hyperlipidemia   [272.2.ICD-9-CM]       Diabetes mellitus type 2 in obese (CMS-HCC)   [665459]         Vital Signs     Blood Pressure Pulse Height Weight Body Mass Index Oxygen Saturation    136/72 mmHg 66 1.69 m (5' 6.53\") 82.555 kg (182 lb) 28.90 kg/m2 94%    Smoking Status                   Former Smoker           Basic Information     Date Of Birth Sex Race Ethnicity Preferred Language    1960 Female White Non- English      Problem List              ICD-10-CM Priority Class Noted - Resolved    Left posterior fascicular hemiblock I44.5 Medium  3/3/2015 - Present    RBBB I45.10 Medium  3/3/2015 - Present    WPW (Ernesto-Parkinson-White syndrome) s/p ablation  I45.6 Medium  3/3/2015 - Present    Essential hypertension I10 Medium  3/3/2015 - Present    Mixed hyperlipidemia E78.2 Medium  3/3/2015 - Present    DM2 (diabetes mellitus, type 2) (CMS-[facility]) E11.9 Medium  3/3/2015 - Present    Hypertrophic obstructive cardiomyopathy (CMS-[facility]) I42.1 Medium  2015 - Present    Migraine G43.909 Low  2015 - Present      Health Maintenance        Date Due Completion Dates    IMM HEP B VACCINE (1 of 3 - Primary Series) 1960 ---    DIABETES MONOFILAMENT / LE EXAM 3/30/1961 ---    RETINAL SCREENING 1978 ---    URINE ACR / MICROALBUMIN 1978 ---    IMM DTaP/Tdap/Td Vaccine (1 - Tdap) 1979 ---    IMM PNEUMOCOCCAL 19-64 (ADULT) MEDIUM RISK SERIES (1 of 1 - PPSV23) " 9/30/1979 ---    PAP SMEAR 9/30/1981 ---    MAMMOGRAM 9/30/2000 ---    COLONOSCOPY 9/30/2010 ---    A1C SCREENING 8/8/2017 2/8/2017, 5/25/2016    FASTING LIPID PROFILE 2/8/2018 2/8/2017, 5/25/2016    SERUM CREATININE 3/15/2018 3/15/2017, 2/8/2017, 5/25/2016, 5/15/2016, 4/1/2016, 2/22/2016, 3/22/2015, 3/21/2015, 8/22/2013            Current Immunizations     No immunizations on file.      Below and/or attached are the medications your provider expects you to take. Review all of your home medications and newly ordered medications with your provider and/or pharmacist. Follow medication instructions as directed by your provider and/or pharmacist. Please keep your medication list with you and share with your provider. Update the information when medications are discontinued, doses are changed, or new medications (including over-the-counter products) are added; and carry medication information at all times in the event of emergency situations     Allergies:  EPINEPHRINE - (reactions not documented)               Medications  Valid as of: May 11, 2017 -  9:39 AM    Generic Name Brand Name Tablet Size Instructions for use    Alendronate Sodium   Take  by mouth.        Atorvastatin Calcium (Tab) LIPITOR 80 MG Take 1 Tab by mouth every day.        B Complex Vitamins   Take 1 Tab by mouth every day.        Calcium Citrate-Vitamin D   Take 1 Tab by mouth 2 Times a Day.        Cholecalciferol (Tab) VITAMIN D 400 UNIT Take 400 Units by mouth 2 Times a Day.        CloNIDine HCl (Tab) CATAPRES 0.1 MG Take 1 Tab by mouth as needed (Elevated BP >160; up to TID).        DiazePAM (Tab) VALIUM 10 MG Take 10 mg by mouth every bedtime.        DilTIAZem HCl Coated Beads (CAPSULE SR 24 HR) CARDIZEM  MG Take 2 Caps by mouth every day.        Insulin Aspart (Solution) NOVOLOG 100 UNIT/ML Inject  as instructed 3 times a day before meals.        Insulin Glargine (Solution) LANTUS 100 UNIT/ML Inject 2-5 Units as instructed 2 Times a Day.  Based on carbs.        Magnesium   Take  by mouth.        MetFORMIN HCl (Tab) GLUCOPHAGE 500 MG Take 500 mg by mouth 2 times a day.        Metoprolol Tartrate (Tab) LOPRESSOR 100 MG Take 1 Tab by mouth 2 times a day.        Oxycodone-Acetaminophen (Tab) PERCOCET 5-325 MG Take 1-2 Tabs by mouth every four hours as needed. Indications: not taking        .                 Medicines prescribed today were sent to:     [id number] - [place], [place] - [address] [place] [address]    Phone: [phone] Fax: [phone]    Open 24 Hours?: No      Medication refill instructions:       If your prescription bottle indicates you have medication refills left, it is not necessary to call your provider’s office. Please contact your pharmacy and they will refill your medication.    If your prescription bottle indicates you do not have any refills left, you may request refills at any time through one of the following ways: The online Bitly system (except Urgent Care), by calling your provider’s office, or by asking your pharmacy to contact your provider’s office with a refill request. Medication refills are processed only during regular business hours and may not be available until the next business day. Your provider may request additional information or to have a follow-up visit with you prior to refilling your medication.   *Please Note: Medication refills are assigned a new Rx number when refilled electronically. Your pharmacy may indicate that no refills were authorized even though a new prescription for the same medication is available at the pharmacy. Please request the medicine by name with the pharmacy before contacting your provider for a refill.           Bitly Access Code: Activation code not generated  Current Bitly Status: Active

## 2017-09-09 ENCOUNTER — APPOINTMENT (OUTPATIENT)
Dept: RADIOLOGY | Facility: MEDICAL CENTER | Age: 57
End: 2017-09-09
Attending: EMERGENCY MEDICINE
Payer: MEDICARE

## 2017-09-09 ENCOUNTER — HOSPITAL ENCOUNTER (EMERGENCY)
Facility: MEDICAL CENTER | Age: 57
End: 2017-09-09
Attending: EMERGENCY MEDICINE
Payer: MEDICARE

## 2017-09-09 VITALS
RESPIRATION RATE: 13 BRPM | HEIGHT: 66 IN | WEIGHT: 190 LBS | BODY MASS INDEX: 30.53 KG/M2 | SYSTOLIC BLOOD PRESSURE: 102 MMHG | OXYGEN SATURATION: 93 % | TEMPERATURE: 96.3 F | DIASTOLIC BLOOD PRESSURE: 58 MMHG | HEART RATE: 57 BPM

## 2017-09-09 DIAGNOSIS — F41.9 ANXIETY: ICD-10-CM

## 2017-09-09 DIAGNOSIS — R51.9 NONINTRACTABLE HEADACHE, UNSPECIFIED CHRONICITY PATTERN, UNSPECIFIED HEADACHE TYPE: ICD-10-CM

## 2017-09-09 LAB
ALBUMIN SERPL BCP-MCNC: 3.6 G/DL (ref 3.2–4.9)
ALBUMIN/GLOB SERPL: 1.4 G/DL
ALP SERPL-CCNC: 80 U/L (ref 30–99)
ALT SERPL-CCNC: 34 U/L (ref 2–50)
ANION GAP SERPL CALC-SCNC: 10 MMOL/L (ref 0–11.9)
AST SERPL-CCNC: 22 U/L (ref 12–45)
BASOPHILS # BLD AUTO: 0.5 % (ref 0–1.8)
BASOPHILS # BLD: 0.06 K/UL (ref 0–0.12)
BILIRUB SERPL-MCNC: 0.4 MG/DL (ref 0.1–1.5)
BNP SERPL-MCNC: 39 PG/ML (ref 0–100)
BUN SERPL-MCNC: 23 MG/DL (ref 8–22)
CALCIUM SERPL-MCNC: 8.9 MG/DL (ref 8.5–10.5)
CHLORIDE SERPL-SCNC: 102 MMOL/L (ref 96–112)
CO2 SERPL-SCNC: 20 MMOL/L (ref 20–33)
CREAT SERPL-MCNC: 0.85 MG/DL (ref 0.5–1.4)
EKG IMPRESSION: NORMAL
EOSINOPHIL # BLD AUTO: 0.16 K/UL (ref 0–0.51)
EOSINOPHIL NFR BLD: 1.4 % (ref 0–6.9)
ERYTHROCYTE [DISTWIDTH] IN BLOOD BY AUTOMATED COUNT: 42.4 FL (ref 35.9–50)
GFR SERPL CREATININE-BSD FRML MDRD: >60 ML/MIN/1.73 M 2
GLOBULIN SER CALC-MCNC: 2.6 G/DL (ref 1.9–3.5)
GLUCOSE SERPL-MCNC: 317 MG/DL (ref 65–99)
HCT VFR BLD AUTO: 36.6 % (ref 37–47)
HGB BLD-MCNC: 12.3 G/DL (ref 12–16)
IMM GRANULOCYTES # BLD AUTO: 0.04 K/UL (ref 0–0.11)
IMM GRANULOCYTES NFR BLD AUTO: 0.4 % (ref 0–0.9)
LYMPHOCYTES # BLD AUTO: 3.73 K/UL (ref 1–4.8)
LYMPHOCYTES NFR BLD: 33.7 % (ref 22–41)
MCH RBC QN AUTO: 31.4 PG (ref 27–33)
MCHC RBC AUTO-ENTMCNC: 33.6 G/DL (ref 33.6–35)
MCV RBC AUTO: 93.4 FL (ref 81.4–97.8)
MONOCYTES # BLD AUTO: 1.01 K/UL (ref 0–0.85)
MONOCYTES NFR BLD AUTO: 9.1 % (ref 0–13.4)
NEUTROPHILS # BLD AUTO: 6.07 K/UL (ref 2–7.15)
NEUTROPHILS NFR BLD: 54.9 % (ref 44–72)
NRBC # BLD AUTO: 0 K/UL
NRBC BLD AUTO-RTO: 0 /100 WBC
PLATELET # BLD AUTO: 262 K/UL (ref 164–446)
PMV BLD AUTO: 9.7 FL (ref 9–12.9)
POTASSIUM SERPL-SCNC: 5.1 MMOL/L (ref 3.6–5.5)
PROT SERPL-MCNC: 6.2 G/DL (ref 6–8.2)
RBC # BLD AUTO: 3.92 M/UL (ref 4.2–5.4)
SODIUM SERPL-SCNC: 132 MMOL/L (ref 135–145)
TROPONIN I SERPL-MCNC: <0.01 NG/ML (ref 0–0.04)
WBC # BLD AUTO: 11.1 K/UL (ref 4.8–10.8)

## 2017-09-09 PROCEDURE — 93005 ELECTROCARDIOGRAM TRACING: CPT

## 2017-09-09 PROCEDURE — 99284 EMERGENCY DEPT VISIT MOD MDM: CPT

## 2017-09-09 PROCEDURE — 84484 ASSAY OF TROPONIN QUANT: CPT

## 2017-09-09 PROCEDURE — 71010 DX-CHEST-PORTABLE (1 VIEW): CPT

## 2017-09-09 PROCEDURE — 80053 COMPREHEN METABOLIC PANEL: CPT

## 2017-09-09 PROCEDURE — 83880 ASSAY OF NATRIURETIC PEPTIDE: CPT

## 2017-09-09 PROCEDURE — 700111 HCHG RX REV CODE 636 W/ 250 OVERRIDE (IP): Performed by: EMERGENCY MEDICINE

## 2017-09-09 PROCEDURE — 85025 COMPLETE CBC W/AUTO DIFF WBC: CPT

## 2017-09-09 PROCEDURE — 700105 HCHG RX REV CODE 258: Performed by: EMERGENCY MEDICINE

## 2017-09-09 PROCEDURE — 96374 THER/PROPH/DIAG INJ IV PUSH: CPT

## 2017-09-09 PROCEDURE — 93005 ELECTROCARDIOGRAM TRACING: CPT | Performed by: EMERGENCY MEDICINE

## 2017-09-09 PROCEDURE — 304561 HCHG STAT O2

## 2017-09-09 PROCEDURE — 96375 TX/PRO/DX INJ NEW DRUG ADDON: CPT

## 2017-09-09 RX ORDER — KETOROLAC TROMETHAMINE 30 MG/ML
30 INJECTION, SOLUTION INTRAMUSCULAR; INTRAVENOUS ONCE
Status: COMPLETED | OUTPATIENT
Start: 2017-09-09 | End: 2017-09-09

## 2017-09-09 RX ORDER — SODIUM CHLORIDE 9 MG/ML
INJECTION, SOLUTION INTRAVENOUS CONTINUOUS
Status: DISCONTINUED | OUTPATIENT
Start: 2017-09-09 | End: 2017-09-09 | Stop reason: HOSPADM

## 2017-09-09 RX ORDER — METOCLOPRAMIDE HYDROCHLORIDE 5 MG/ML
10 INJECTION INTRAMUSCULAR; INTRAVENOUS ONCE
Status: COMPLETED | OUTPATIENT
Start: 2017-09-09 | End: 2017-09-09

## 2017-09-09 RX ADMIN — METOCLOPRAMIDE 10 MG: 5 INJECTION, SOLUTION INTRAMUSCULAR; INTRAVENOUS at 15:26

## 2017-09-09 RX ADMIN — SODIUM CHLORIDE 250 ML: 9 INJECTION, SOLUTION INTRAVENOUS at 15:27

## 2017-09-09 RX ADMIN — KETOROLAC TROMETHAMINE 30 MG: 30 INJECTION, SOLUTION INTRAMUSCULAR at 15:26

## 2017-09-09 ASSESSMENT — PAIN SCALES - GENERAL
PAINLEVEL_OUTOF10: 3
PAINLEVEL_OUTOF10: 6

## 2017-09-09 NOTE — ED NOTES
Patient to ED via EMS from home with complaints of fatigue, headache, sob and bradycardia. She took a dose of clonidine 0.1 mg without taking her BP or HR and then started to feel tired. She found her HR to be in the 40 to 50s. She was worried and called EMS. She states she was at [facility] today and was generally not feeling and experiencing a lot of stress. She states she has been feeling ill with URI symptoms. She started to develop headache. She felt her heart was racing and her RR was elevated she then took the clonidine thinking her BP was elevated. She does report hx of anxiety, depression, HTN and migraine HA.     BP was SBP 90s by EMS. Currently 101/39. HR 51, SR. Denies CP or pressure.

## 2017-09-10 NOTE — DISCHARGE INSTRUCTIONS
Rest at home today, return here if you feel you're having new or worsening symptoms. If you are not completely well by Monday morning call your doctor and arrange office recheck during the week

## 2017-09-10 NOTE — ED PROVIDER NOTES
ED Provider Note    CHIEF COMPLAINT  Chief Complaint   Patient presents with   • Head Ache   • Shortness of Breath   • Bradycardia       HPI  Kizzy Chapman is a 56 y.o. female who presentsTo the emergency department complaining that she is very anxious about taking an extra dose of clonidine today because she thought her blood pressure might be elevated. The patient does have a history of hypertension and she says that her doctor has given her clonidine which she is to take if she has elevated blood pressure not controlled by her regular medicines. She has been under a lot of stress recently her mother is in the hospital and she has had a lot of demands at home and says she is feeling extremely anxious and today felt anxious and had a headache and thought that her blood pressure was too high but she did not actually check her blood pressure she just took a clonidine tablet and then later she checked her blood pressure and found that it was not elevated so she got even more anxious. She took the clonidine tablet about 45 minutes before coming to the emergency department.    REVIEW OF SYSTEMS no chest pain no shortness of breath no nausea vomiting or diarrhea. All other systems negative    PAST MEDICAL HISTORY  Past Medical History:   Diagnosis Date   • Hypertrophic obstructive cardiomyopathy (CMS-HCC) 4/1/2015   • Anxiety    • Diabetes (CMS-[facility])    • Hypercholesterolemia    • Hypertension    • Hypertrophic cardiomyopathy (CMS-[facility])    • Obesity    • Psychiatric disorder        FAMILY HISTORY  No family history on file.    SOCIAL HISTORY  Social History     Social History   • Marital status: Single     Spouse name: N/A   • Number of children: N/A   • Years of education: N/A     Social History Main Topics   • Smoking status: Former Smoker     Packs/day: 0.10     Years: 10.00     Types: Cigarettes     Quit date: 4/27/2015   • Smokeless tobacco: Never Used   • Alcohol use Yes      Comment: occasional   • Drug use: No  "  • Sexual activity: Not on file     Other Topics Concern   • Not on file     Social History Narrative   • No narrative on file       SURGICAL HISTORY  Past Surgical History:   Procedure Laterality Date   • OTHER CARDIAC SURGERY      ablation of heart for WPW   • OTHER ORTHOPEDIC SURGERY      tib fib with pins; left total hip replacement       CURRENT MEDICATIONS  Home Medications     Reviewed by Lety Love R.N. (Registered Nurse) on 09/09/17 at 1503  Med List Status: Complete   Medication Last Dose Status   atorvastatin (LIPITOR) 80 MG tablet 9/8/2017 Active   B Complex Vitamins (VITAMIN B COMPLEX PO) not taking Active   Calcium Carbonate-Vitamin D (CALCIUM + D PO) 5/11/2017 Active   Cholecalciferol (VITAMIN D) 400 UNIT Tab 5/11/2017 Active   clonidine (CATAPRES) 0.1 MG Tab prn Active   diazepam (VALIUM) 10 MG tablet 5/11/2017 Active   diltiazem CD (CARDIZEM CD) 120 MG CAPSULE SR 24 HR 5/11/2017 Active   insulin aspart (NOVOLOG) 100 UNIT/ML Solution 5/11/2017 Active   insulin glargine (LANTUS) 100 UNIT/ML SOLN 5/11/2017 Active   MAGNESIUM PO prn Active   metformin (GLUCOPHAGE) 500 MG Tab 5/11/2017 Active   metoprolol (LOPRESSOR) 100 MG Tab 5/11/2017 Active                ALLERGIES  Allergies   Allergen Reactions   • Epinephrine      Tightness in arms and legs       PHYSICAL EXAM  VITAL SIGNS: /58   Pulse (!) 57   Temp (!) 35.7 °C (96.3 °F)   Resp 13   Ht 1.676 m (5' 6\")   Wt 86.2 kg (190 lb)   SpO2 93%   BMI 30.67 kg/m²    Oxygen saturation is interpreted asAdequate  Constitutional: Awake verbal talkative anxious but otherwise nontoxic-appearing  HENT: Mucous membranes are moist  Eyes: No erythema or discharge or jaundice  Neck: Trachea midline no JVD  Cardiovascular: Regular rate and rhythm  Lungs: There are and equal bilaterally with no apparent difficulty breathing  Abdomen/Back: Obese soft nontender no rebound or guarding or peritoneal findings  Skin: Warm and dry  Musculoskeletal: No leg " edema or calf tenderness  Neurologic: Awake verbal and moving all extremities without difficulty    Laboratory  A CBC shows white blood cell count of 11.1, hemoglobin and crit 12.3, patient metabolic panel shows an elevated blood sugar of 317. LFTs are unremarkable troponin and BNP are normal    EKG interpretation  A 12-lead EKG shows sinus rhythm 51 bpm there is an S wave in lead 1 suggesting right bundle-branch block pattern is no pathologic ST elevation or depression    Radiology  DX-CHEST-PORTABLE (1 VIEW)   Final Result      1.  Cardiomegaly with mild interstitial edema.      2.  Bibasilar atelectasis.        MEDICAL DECISION MAKING and DISPOSITION  In the emergency department an IV was established and the patient was placed on a cardiac monitor. She requested something like Motrin for her headaches which has been helpful in the past so she was given intravenous Toradol and Reglan. The medications have led to very good relief of her headache and the patient generally appears well although very anxious. At this point in time I do not think that she needs to stay in the hospital and they will be safe for her to follow-up with her primary care doctor. I reviewed all the results in detail with her and if she feels she is having any new or worsening symptoms she is to return here once for recheck otherwise she is to follow up with her primary care doctor as soon as possible during the week    IMPRESSION  1. Headache  2. Anxiety  3. Hyperglycemia      Electronically signed by: Chris Fink, 9/10/2017 2:50 PM

## 2017-12-05 DIAGNOSIS — I45.6 WPW (WOLFF-PARKINSON-WHITE SYNDROME): ICD-10-CM

## 2017-12-05 DIAGNOSIS — I10 ESSENTIAL HYPERTENSION: ICD-10-CM

## 2017-12-05 DIAGNOSIS — I42.2 HYPERTROPHIC CARDIOMYOPATHY (HCC): ICD-10-CM

## 2017-12-05 RX ORDER — DILTIAZEM HYDROCHLORIDE 120 MG/1
120 CAPSULE, COATED, EXTENDED RELEASE ORAL 2 TIMES DAILY
Qty: 180 CAP | Refills: 0 | Status: SHIPPED | OUTPATIENT
Start: 2017-12-05 | End: 2018-03-07

## 2017-12-19 DIAGNOSIS — I10 ESSENTIAL HYPERTENSION: ICD-10-CM

## 2017-12-19 DIAGNOSIS — I42.2 HYPERTROPHIC CARDIOMYOPATHY (HCC): ICD-10-CM

## 2017-12-19 DIAGNOSIS — E78.5 HYPERLIPIDEMIA, UNSPECIFIED HYPERLIPIDEMIA TYPE: ICD-10-CM

## 2017-12-19 DIAGNOSIS — I45.6 WPW (WOLFF-PARKINSON-WHITE SYNDROME): ICD-10-CM

## 2017-12-19 RX ORDER — METOPROLOL TARTRATE 100 MG/1
100 TABLET ORAL 2 TIMES DAILY
Qty: 180 TAB | Refills: 0 | OUTPATIENT
Start: 2017-12-19 | End: 2018-03-19 | Stop reason: SDUPTHER

## 2018-03-07 ENCOUNTER — OFFICE VISIT (OUTPATIENT)
Dept: CARDIOLOGY | Facility: MEDICAL CENTER | Age: 58
End: 2018-03-07
Payer: MEDICARE

## 2018-03-07 VITALS
SYSTOLIC BLOOD PRESSURE: 132 MMHG | BODY MASS INDEX: 31.02 KG/M2 | WEIGHT: 193 LBS | HEIGHT: 66 IN | HEART RATE: 80 BPM | OXYGEN SATURATION: 92 % | DIASTOLIC BLOOD PRESSURE: 80 MMHG

## 2018-03-07 DIAGNOSIS — E78.2 MIXED HYPERLIPIDEMIA: ICD-10-CM

## 2018-03-07 DIAGNOSIS — I10 ESSENTIAL HYPERTENSION: ICD-10-CM

## 2018-03-07 DIAGNOSIS — E11.65 TYPE 2 DIABETES MELLITUS WITH HYPERGLYCEMIA, WITHOUT LONG-TERM CURRENT USE OF INSULIN (HCC): ICD-10-CM

## 2018-03-07 DIAGNOSIS — I45.6 WPW (WOLFF-PARKINSON-WHITE SYNDROME): ICD-10-CM

## 2018-03-07 DIAGNOSIS — I42.2 HYPERTROPHIC CARDIOMYOPATHY (HCC): ICD-10-CM

## 2018-03-07 PROCEDURE — 99214 OFFICE O/P EST MOD 30 MIN: CPT | Performed by: INTERNAL MEDICINE

## 2018-03-07 RX ORDER — DILTIAZEM HYDROCHLORIDE 240 MG/1
240 CAPSULE, COATED, EXTENDED RELEASE ORAL 2 TIMES DAILY
Qty: 180 CAP | Refills: 3 | Status: SHIPPED | OUTPATIENT
Start: 2018-03-07 | End: 2019-01-03

## 2018-03-07 ASSESSMENT — ENCOUNTER SYMPTOMS
SHORTNESS OF BREATH: 0
CLAUDICATION: 0
PALPITATIONS: 0
COUGH: 0
DIZZINESS: 0
HEADACHES: 0
EYE PAIN: 0
CHILLS: 0
SENSORY CHANGE: 0
VOMITING: 0
HALLUCINATIONS: 0
BLOOD IN STOOL: 0
SPEECH CHANGE: 0
PND: 0
FALLS: 0
EYE DISCHARGE: 0
ORTHOPNEA: 0
BRUISES/BLEEDS EASILY: 0
WEIGHT LOSS: 0
FEVER: 0
LOSS OF CONSCIOUSNESS: 0
NAUSEA: 0
DEPRESSION: 0
MYALGIAS: 0
BLURRED VISION: 0
ABDOMINAL PAIN: 0
DOUBLE VISION: 0

## 2018-03-07 NOTE — PROGRESS NOTES
Subjective:   Kizzy Chapman is a 55 y.o. female who presents today for cardiac care of WPW, HCM, NSVT.      In the interim, patient has been doing well without having any symptoms. Patient denies having chest pain, dyspnea, palpitation, presyncope, syncope episodes. Able to climb up at least 2 flights of stairs.     Also prior history of ETOH use but no more.     She was seen by one of my partners Dr. Mayer for EP evaluation. No further EP workup or treatment at this time.    Chief Complaint: palpitation.    Past Medical History:   Diagnosis Date   • Anxiety    • Diabetes (CMS-HCC)    • Hypercholesterolemia    • Hypertension    • Hypertrophic cardiomyopathy (CMS-HCC)    • Hypertrophic obstructive cardiomyopathy(425.11) 4/1/2015   • Obesity    • Psychiatric disorder      Past Surgical History:   Procedure Laterality Date   • OTHER CARDIAC SURGERY      ablation of heart for WPW   • OTHER ORTHOPEDIC SURGERY      tib fib with pins; left total hip replacement     History reviewed. No pertinent family history.  History   Smoking Status   • Former Smoker   • Packs/day: 0.10   • Years: 10.00   • Types: Cigarettes   • Quit date: 4/27/2015   Smokeless Tobacco   • Never Used     Allergies   Allergen Reactions   • Epinephrine      Tightness in arms and legs     Outpatient Encounter Prescriptions as of 3/7/2018   Medication Sig Dispense Refill   • Insulin Lispro (HUMALOG SC) Inject  as instructed.     • DILTIAZem CD (CARDIZEM CD) 240 MG CAPSULE SR 24 HR Take 1 Cap by mouth 2 Times a Day. 180 Cap 3   • atorvastatin (LIPITOR) 80 MG tablet Take 1 Tab by mouth every day. 90 Tab 3   • Cholecalciferol (VITAMIN D) 400 UNIT Tab Take 400 Units by mouth 2 Times a Day.     • metformin (GLUCOPHAGE) 500 MG Tab Take 500 mg by mouth 2 times a day.     • clonidine (CATAPRES) 0.1 MG Tab Take 1 Tab by mouth as needed (Elevated BP >160; up to TID). 90 Tab 3   • MAGNESIUM PO Take  by mouth.     • insulin glargine (LANTUS) 100 UNIT/ML SOLN Inject  "30 Units as instructed every morning. Based on carbs.      • Calcium Carbonate-Vitamin D (CALCIUM + D PO) Take 1 Tab by mouth 2 Times a Day.     • B Complex Vitamins (VITAMIN B COMPLEX PO) Take 1 Tab by mouth every day.     • diazepam (VALIUM) 10 MG tablet Take 5 mg by mouth every bedtime.     • metoprolol (LOPRESSOR) 100 MG Tab Take 1 Tab by mouth 2 times a day. 180 Tab 0   • [DISCONTINUED] diltiazem CD (CARDIZEM CD) 120 MG CAPSULE SR 24 HR Take 1 Cap by mouth 2 Times a Day. Needs to be seen for further refills. Thank you 180 Cap 0   • insulin aspart (NOVOLOG) 100 UNIT/ML Solution Inject  as instructed 3 times a day before meals.       No facility-administered encounter medications on file as of 3/7/2018.      Review of Systems   Constitutional: Negative for chills, fever, malaise/fatigue and weight loss.   HENT: Negative for ear discharge, ear pain, hearing loss and nosebleeds.    Eyes: Negative for blurred vision, double vision, pain and discharge.   Respiratory: Negative for cough and shortness of breath.    Cardiovascular: Negative for chest pain, palpitations, orthopnea, claudication, leg swelling and PND.   Gastrointestinal: Negative for abdominal pain, blood in stool, melena, nausea and vomiting.   Genitourinary: Negative for dysuria and hematuria.   Musculoskeletal: Negative for falls, joint pain and myalgias.   Skin: Negative for itching and rash.   Neurological: Negative for dizziness, sensory change, speech change, loss of consciousness and headaches.   Endo/Heme/Allergies: Negative for environmental allergies. Does not bruise/bleed easily.   Psychiatric/Behavioral: Negative for depression, hallucinations and suicidal ideas.        Objective:   /80   Pulse 80   Ht 1.676 m (5' 6\")   Wt 87.5 kg (193 lb)   SpO2 92%   BMI 31.15 kg/m²     Physical Exam   Constitutional: She is oriented to person, place, and time. She appears well-developed and well-nourished.   HENT:   Head: Normocephalic and " atraumatic.   Eyes: EOM are normal.   Neck: No JVD present.   Cardiovascular: Normal rate, regular rhythm and intact distal pulses.  Exam reveals no gallop and no friction rub.    Murmur heard.  Pulmonary/Chest: No respiratory distress. She has no wheezes. She has no rales. She exhibits no tenderness.   Abdominal: She exhibits no distension. There is no tenderness. There is no rebound and no guarding.   Musculoskeletal: She exhibits no edema or tenderness.   Lymphadenopathy:     She has no cervical adenopathy.   Neurological: She is alert and oriented to person, place, and time.   Skin: Skin is dry.   Psychiatric: She has a normal mood and affect.   Nursing note and vitals reviewed.      Assessment:     1. Hypertrophic cardiomyopathy (CMS-HCC)  DILTIAZem CD (CARDIZEM CD) 240 MG CAPSULE SR 24 HR    COMP METABOLIC PANEL    LIPID PANEL   2. WPW (Ernesto-Parkinson-White syndrome) s/p ablation 1997  DILTIAZem CD (CARDIZEM CD) 240 MG CAPSULE SR 24 HR    COMP METABOLIC PANEL    LIPID PANEL   3. Essential hypertension  DILTIAZem CD (CARDIZEM CD) 240 MG CAPSULE SR 24 HR    COMP METABOLIC PANEL    LIPID PANEL   4. Mixed hyperlipidemia  COMP METABOLIC PANEL    LIPID PANEL   5. Type 2 diabetes mellitus with hyperglycemia, without long-term current use of insulin (CMS-HCC)  COMP METABOLIC PANEL    LIPID PANEL       Medical Decision Making:  Today's Assessment / Status / Plan:   Heart rate is not optimized. Goal of less than 70.  Will increase Cardizem to 240 mg bid.  Continue Metoprolol 100 mg po bid.    I will see patient back in clinic with lab tests and studies results in 6 months.    I thank you Dr. Jaimes for referring patient to our Cardiology Clinic today.

## 2018-03-07 NOTE — LETTER
[facility]   [address]   [address]  [place] [address]  Phone: [phone]  Fax: [phone]              Kizzy Chapman  1960    Encounter Date: 3/7/2018    Eliceo Arriaga M.D.          PROGRESS NOTE:  Subjective:   Kizzy Chapman is a 55 y.o. female who presents today for cardiac care of WPW, HCM, NSVT.      In the interim, patient has been doing well without having any symptoms. Patient denies having chest pain, dyspnea, palpitation, presyncope, syncope episodes. Able to climb up at least 2 flights of stairs.     Also prior history of ETOH use but no more.     She was seen by one of my partners Dr. Mayer for EP evaluation. No further EP workup or treatment at this time.    Chief Complaint: palpitation.    Past Medical History:   Diagnosis Date   • Anxiety    • Diabetes (CMS-HCC)    • Hypercholesterolemia    • Hypertension    • Hypertrophic cardiomyopathy (CMS-HCC)    • Hypertrophic obstructive cardiomyopathy(425.11) 4/1/2015   • Obesity    • Psychiatric disorder      Past Surgical History:   Procedure Laterality Date   • OTHER CARDIAC SURGERY      ablation of heart for WPW   • OTHER ORTHOPEDIC SURGERY      tib fib with pins; left total hip replacement     History reviewed. No pertinent family history.  History   Smoking Status   • Former Smoker   • Packs/day: 0.10   • Years: 10.00   • Types: Cigarettes   • Quit date: 4/27/2015   Smokeless Tobacco   • Never Used     Allergies   Allergen Reactions   • Epinephrine      Tightness in arms and legs     Outpatient Encounter Prescriptions as of 3/7/2018   Medication Sig Dispense Refill   • Insulin Lispro (HUMALOG SC) Inject  as instructed.     • DILTIAZem CD (CARDIZEM CD) 240 MG CAPSULE SR 24 HR Take 1 Cap by mouth 2 Times a Day. 180 Cap 3   • atorvastatin (LIPITOR) 80 MG tablet Take 1 Tab by mouth every day. 90 Tab 3   • Cholecalciferol (VITAMIN D) 400 UNIT Tab Take 400 Units by  mouth 2 Times a Day.     • metformin (GLUCOPHAGE) 500 MG Tab Take 500 mg by mouth 2 times a day.     • clonidine (CATAPRES) 0.1 MG Tab Take 1 Tab by mouth as needed (Elevated BP >160; up to TID). 90 Tab 3   • MAGNESIUM PO Take  by mouth.     • insulin glargine (LANTUS) 100 UNIT/ML SOLN Inject 30 Units as instructed every morning. Based on carbs.      • Calcium Carbonate-Vitamin D (CALCIUM + D PO) Take 1 Tab by mouth 2 Times a Day.     • B Complex Vitamins (VITAMIN B COMPLEX PO) Take 1 Tab by mouth every day.     • diazepam (VALIUM) 10 MG tablet Take 5 mg by mouth every bedtime.     • metoprolol (LOPRESSOR) 100 MG Tab Take 1 Tab by mouth 2 times a day. 180 Tab 0   • [DISCONTINUED] diltiazem CD (CARDIZEM CD) 120 MG CAPSULE SR 24 HR Take 1 Cap by mouth 2 Times a Day. Needs to be seen for further refills. Thank you 180 Cap 0   • insulin aspart (NOVOLOG) 100 UNIT/ML Solution Inject  as instructed 3 times a day before meals.       No facility-administered encounter medications on file as of 3/7/2018.      Review of Systems   Constitutional: Negative for chills, fever, malaise/fatigue and weight loss.   HENT: Negative for ear discharge, ear pain, hearing loss and nosebleeds.    Eyes: Negative for blurred vision, double vision, pain and discharge.   Respiratory: Negative for cough and shortness of breath.    Cardiovascular: Negative for chest pain, palpitations, orthopnea, claudication, leg swelling and PND.   Gastrointestinal: Negative for abdominal pain, blood in stool, melena, nausea and vomiting.   Genitourinary: Negative for dysuria and hematuria.   Musculoskeletal: Negative for falls, joint pain and myalgias.   Skin: Negative for itching and rash.   Neurological: Negative for dizziness, sensory change, speech change, loss of consciousness and headaches.   Endo/Heme/Allergies: Negative for environmental allergies. Does not bruise/bleed easily.   Psychiatric/Behavioral: Negative for depression, hallucinations and  "suicidal ideas.        Objective:   /80   Pulse 80   Ht 1.676 m (5' 6\")   Wt 87.5 kg (193 lb)   SpO2 92%   BMI 31.15 kg/m²      Physical Exam   Constitutional: She is oriented to person, place, and time. She appears well-developed and well-nourished.   HENT:   Head: Normocephalic and atraumatic.   Eyes: EOM are normal.   Neck: No JVD present.   Cardiovascular: Normal rate, regular rhythm and intact distal pulses.  Exam reveals no gallop and no friction rub.    Murmur heard.  Pulmonary/Chest: No respiratory distress. She has no wheezes. She has no rales. She exhibits no tenderness.   Abdominal: She exhibits no distension. There is no tenderness. There is no rebound and no guarding.   Musculoskeletal: She exhibits no edema or tenderness.   Lymphadenopathy:     She has no cervical adenopathy.   Neurological: She is alert and oriented to person, place, and time.   Skin: Skin is dry.   Psychiatric: She has a normal mood and affect.   Nursing note and vitals reviewed.      Assessment:     1. Hypertrophic cardiomyopathy (CMS-HCC)  DILTIAZem CD (CARDIZEM CD) 240 MG CAPSULE SR 24 HR    COMP METABOLIC PANEL    LIPID PANEL   2. WPW (Ernesto-Parkinson-White syndrome) s/p ablation 1997  DILTIAZem CD (CARDIZEM CD) 240 MG CAPSULE SR 24 HR    COMP METABOLIC PANEL    LIPID PANEL   3. Essential hypertension  DILTIAZem CD (CARDIZEM CD) 240 MG CAPSULE SR 24 HR    COMP METABOLIC PANEL    LIPID PANEL   4. Mixed hyperlipidemia  COMP METABOLIC PANEL    LIPID PANEL   5. Type 2 diabetes mellitus with hyperglycemia, without long-term current use of insulin (CMS-HCC)  COMP METABOLIC PANEL    LIPID PANEL       Medical Decision Making:  Today's Assessment / Status / Plan:   Heart rate is not optimized. Goal of less than 70.  Will increase Cardizem to 240 mg bid.  Continue Metoprolol 100 mg po bid.    I will see patient back in clinic with lab tests and studies results in 6 months.    I thank you Dr. Jaimes for referring patient to our " Cardiology Clinic today.        Madonna Jaimes M.D.  [address]  [address] [address]  VIA Facsimile: [phone]

## 2018-03-19 DIAGNOSIS — I10 ESSENTIAL HYPERTENSION: ICD-10-CM

## 2018-03-19 DIAGNOSIS — I42.2 HYPERTROPHIC CARDIOMYOPATHY (HCC): ICD-10-CM

## 2018-03-19 DIAGNOSIS — E78.5 HYPERLIPIDEMIA, UNSPECIFIED HYPERLIPIDEMIA TYPE: ICD-10-CM

## 2018-03-19 DIAGNOSIS — I45.6 WPW (WOLFF-PARKINSON-WHITE SYNDROME): ICD-10-CM

## 2018-03-19 RX ORDER — METOPROLOL TARTRATE 100 MG/1
100 TABLET ORAL 2 TIMES DAILY
Qty: 180 TAB | Refills: 3 | Status: SHIPPED | OUTPATIENT
Start: 2018-03-19 | End: 2019-01-03

## 2018-04-10 DIAGNOSIS — E78.5 HYPERLIPIDEMIA, UNSPECIFIED HYPERLIPIDEMIA TYPE: ICD-10-CM

## 2018-04-10 RX ORDER — ATORVASTATIN CALCIUM 80 MG/1
TABLET, FILM COATED ORAL
Qty: 90 TAB | Refills: 3 | Status: SHIPPED | OUTPATIENT
Start: 2018-04-10 | End: 2019-01-03 | Stop reason: SDUPTHER

## 2018-05-10 ENCOUNTER — HOSPITAL ENCOUNTER (EMERGENCY)
Facility: MEDICAL CENTER | Age: 58
End: 2018-05-10
Attending: EMERGENCY MEDICINE
Payer: MEDICARE

## 2018-05-10 ENCOUNTER — APPOINTMENT (OUTPATIENT)
Dept: RADIOLOGY | Facility: MEDICAL CENTER | Age: 58
End: 2018-05-10
Attending: EMERGENCY MEDICINE
Payer: MEDICARE

## 2018-05-10 VITALS
DIASTOLIC BLOOD PRESSURE: 78 MMHG | RESPIRATION RATE: 16 BRPM | HEART RATE: 84 BPM | BODY MASS INDEX: 32.86 KG/M2 | TEMPERATURE: 98.5 F | WEIGHT: 178.57 LBS | HEIGHT: 62 IN | SYSTOLIC BLOOD PRESSURE: 118 MMHG | OXYGEN SATURATION: 96 %

## 2018-05-10 DIAGNOSIS — J06.9 UPPER RESPIRATORY TRACT INFECTION, UNSPECIFIED TYPE: ICD-10-CM

## 2018-05-10 LAB
GLUCOSE BLD-MCNC: 261 MG/DL (ref 65–99)
S PYO AG THROAT QL: NORMAL
SIGNIFICANT IND 70042: NORMAL
SITE SITE: NORMAL
SOURCE SOURCE: NORMAL

## 2018-05-10 PROCEDURE — 87081 CULTURE SCREEN ONLY: CPT

## 2018-05-10 PROCEDURE — 87880 STREP A ASSAY W/OPTIC: CPT

## 2018-05-10 PROCEDURE — 71046 X-RAY EXAM CHEST 2 VIEWS: CPT

## 2018-05-10 PROCEDURE — 82962 GLUCOSE BLOOD TEST: CPT

## 2018-05-10 PROCEDURE — 99284 EMERGENCY DEPT VISIT MOD MDM: CPT

## 2018-05-10 PROCEDURE — A9270 NON-COVERED ITEM OR SERVICE: HCPCS | Performed by: EMERGENCY MEDICINE

## 2018-05-10 PROCEDURE — 700102 HCHG RX REV CODE 250 W/ 637 OVERRIDE(OP): Performed by: EMERGENCY MEDICINE

## 2018-05-10 RX ORDER — ACETAMINOPHEN 325 MG/1
650 TABLET ORAL ONCE
Status: COMPLETED | OUTPATIENT
Start: 2018-05-10 | End: 2018-05-10

## 2018-05-10 RX ADMIN — ACETAMINOPHEN 650 MG: 325 TABLET, FILM COATED ORAL at 11:28

## 2018-05-10 ASSESSMENT — PAIN SCALES - GENERAL: PAINLEVEL_OUTOF10: 7

## 2018-05-10 NOTE — ED TRIAGE NOTES
C/O bilateral ear pain, sore throat, cough productive of yellow, x 6 days, was seen at [facility] and diagnosed w pharyngitis, given ' something in my iv then a medicine that was 26 dollars, and I could not afford it', crying at triage, stating she had a cardiac arrest after her doctor overdosed her then got sepsis. States she is here mainly because of ear pain, no cough noted at triage.Is an IDDM not testing because she lost her glucometer

## 2018-05-10 NOTE — ED PROVIDER NOTES
ED Provider Note      CHIEF COMPLAINT  Chief Complaint   Patient presents with   • Cough   • Sore Throat   • Ear Pain       HPI  Kizzy Chapman is a 57 y.o. female who presents with cough, congestion, runny nose and sore throat. Symptoms started about one week ago. Seen at [facility] and diagnosed with viral pharyngitis. Given some medication for symptomatic management that she could not afford. States that her symptoms are persistent. Pain with swallowing. Hoarse voice. Cough is dry and nonproductive. No difficulty breathing. Still drinking liquids, but doesn't feel like she can eat. Pain is in her throat and radiates into both years. No facial pain.     Patient notes that her glucometer broke and she requested and none from her primary. She has not refilled this. She has also run out of her metformin. Reports to me that she has been taking her insulin.    REVIEW OF SYSTEMS  See HPI    PAST MEDICAL HISTORY  Past Medical History:   Diagnosis Date   • Anxiety    • Diabetes (CMS-[facility]) ([facility])    • Hypercholesterolemia    • Hypertension    • Hypertrophic cardiomyopathy (CMS-[facility]) ([facility])    • Hypertrophic obstructive cardiomyopathy(425.11) 4/1/2015   • Obesity    • Psychiatric disorder        FAMILY HISTORY  No family history on file.    SOCIAL HISTORY  Social History   Substance Use Topics   • Smoking status: Former Smoker     Packs/day: 0.10     Years: 10.00     Types: Cigarettes     Quit date: 4/27/2015   • Smokeless tobacco: Never Used   • Alcohol use Yes      Comment: occasional       SURGICAL HISTORY  Past Surgical History:   Procedure Laterality Date   • OTHER CARDIAC SURGERY      ablation of heart for WPW   • OTHER ORTHOPEDIC SURGERY      tib fib with pins; left total hip replacement       CURRENT MEDICATIONS  Home Medications    **Home medications have not yet been reviewed for this encounter**         ALLERGIES  Allergies   Allergen Reactions   • Epinephrine      Tightness in arms and legs  "      PHYSICAL EXAM  VITAL SIGNS: /82   Pulse 90   Temp 36.9 °C (98.5 °F)   Resp 16   Ht 1.575 m (5' 2\")   Wt 81 kg (178 lb 9.2 oz)   SpO2 96%   BMI 32.66 kg/m²   Constitutional :  No acute distress, Non-toxic appearance. Hoarse voice HENT: Head atraumatic, both tympanic membrane are clear without erythema or discharge. Pharynx is normal without erythema or exudate or asymmetry  Eyes: Normal inspection, no discharge, no injection  Neck: Normal range of motion, No tenderness, Supple, No stridor.   Lymphatic: no lymphadenopathy.   Cardiovascular: Normal heart rate, Normal rhythm, No murmurs   Thorax & Lungs: Lungs are clear to auscultation bilaterally without wheezes, rales, rhonchi  Skin: Warm, Dry, No erythema, No rash.   Extremities: No edema, No tenderness, No cyanosis, No clubbing.     RADIOLOGY/PROCEDURES  DX-CHEST-2 VIEWS   Final Result         1. No active cardiopulmonary abnormalities are identified.         Imaging is interpreted by radiologist and reviewed by me    LABS:  Results for orders placed or performed during the hospital encounter of 05/10/18   RAPID STREP, CULT IF INDICATED (CULTURE IF NEGATIVE)   Result Value Ref Range    Significant Indicator NEG     Source THRT     Site THROAT     Rapid Strep Screen       Negative for Group A streptococcus.  A negative result may be obtained if the specimen is  inadequate or antigen concentration is below the  sensitivity of the test. This negative test will be followed  up with a culture as requested.     ACCU-CHEK GLUCOSE   Result Value Ref Range    Glucose - Accu-Ck 261 (H) 65 - 99 mg/dL         COURSE & MEDICAL DECISION MAKING  Patient presents with viral URI symptoms. Her vital signs are normal. Her exam was benign. A rapid strep screen was obtained ruling out strep throat area and she does not have suggestion of pharyngeal abscess. Chest x-ray was without pneumonia or other acute findings. Suspect symptoms related to viral illness. Advised " symptomatic care. Patient has diabetes. She is out of her metformin. I refilled her dose. She also states that her glucometer broke. I've given her a prescription for another glucometer. I have asked her to follow up with her primary doctor next week. She should return to the ER for any difficulty breathing, worsening symptoms are not improving or concern.    Patient referred to primary for blood pressure management    FINAL IMPRESSION  1. Viral upper respiratory infection  2. Diabetes    This dictation was created using voice recognition software. The accuracy of the dictation is limited to the abilities of the software.   the nursing notes were reviewed and certain aspects of this information were incorporated into this note.      Electronically signed by: Alton Canales, 5/10/2018

## 2018-05-10 NOTE — DISCHARGE INSTRUCTIONS
"Upper Respiratory Infection, Adult  Most upper respiratory infections (URIs) are a viral infection of the air passages leading to the lungs. A URI affects the nose, throat, and upper air passages. The most common type of URI is nasopharyngitis and is typically referred to as \"the common cold.\"  URIs run their course and usually go away on their own. Most of the time, a URI does not require medical attention, but sometimes a bacterial infection in the upper airways can follow a viral infection. This is called a secondary infection. Sinus and middle ear infections are common types of secondary upper respiratory infections.  Bacterial pneumonia can also complicate a URI. A URI can worsen asthma and chronic obstructive pulmonary disease (COPD). Sometimes, these complications can require emergency medical care and may be life threatening.  What are the causes?  Almost all URIs are caused by viruses. A virus is a type of germ and can spread from one person to another.  What increases the risk?  You may be at risk for a URI if:  · You smoke.  · You have chronic heart or lung disease.  · You have a weakened defense (immune) system.  · You are very young or very old.  · You have nasal allergies or asthma.  · You work in crowded or poorly ventilated areas.  · You work in health care facilities or schools.  What are the signs or symptoms?  Symptoms typically develop 2-3 days after you come in contact with a cold virus. Most viral URIs last 7-10 days. However, viral URIs from the influenza virus (flu virus) can last 14-18 days and are typically more severe. Symptoms may include:  · Runny or stuffy (congested) nose.  · Sneezing.  · Cough.  · Sore throat.  · Headache.  · Fatigue.  · Fever.  · Loss of appetite.  · Pain in your forehead, behind your eyes, and over your cheekbones (sinus pain).  · Muscle aches.  How is this diagnosed?  Your health care provider may diagnose a URI by:  · Physical exam.  · Tests to check that your " symptoms are not due to another condition such as:  ¨ Strep throat.  ¨ Sinusitis.  ¨ Pneumonia.  ¨ Asthma.  How is this treated?  A URI goes away on its own with time. It cannot be cured with medicines, but medicines may be prescribed or recommended to relieve symptoms. Medicines may help:  · Reduce your fever.  · Reduce your cough.  · Relieve nasal congestion.  Follow these instructions at home:  · Take medicines only as directed by your health care provider.  · Gargle warm saltwater or take cough drops to comfort your throat as directed by your health care provider.  · Use a warm mist humidifier or inhale steam from a shower to increase air moisture. This may make it easier to breathe.  · Drink enough fluid to keep your urine clear or pale yellow.  · Eat soups and other clear broths and maintain good nutrition.  · Rest as needed.  · Return to work when your temperature has returned to normal or as your health care provider advises. You may need to stay home longer to avoid infecting others. You can also use a face mask and careful hand washing to prevent spread of the virus.  · Increase the usage of your inhaler if you have asthma.  · Do not use any tobacco products, including cigarettes, chewing tobacco, or electronic cigarettes. If you need help quitting, ask your health care provider.  How is this prevented?  The best way to protect yourself from getting a cold is to practice good hygiene.  · Avoid oral or hand contact with people with cold symptoms.  · Wash your hands often if contact occurs.  There is no clear evidence that vitamin C, vitamin E, echinacea, or exercise reduces the chance of developing a cold. However, it is always recommended to get plenty of rest, exercise, and practice good nutrition.  Contact a health care provider if:  · You are getting worse rather than better.  · Your symptoms are not controlled by medicine.  · You have chills.  · You have worsening shortness of breath.  · You have brown  or red mucus.  · You have yellow or brown nasal discharge.  · You have pain in your face, especially when you bend forward.  · You have a fever.  · You have swollen neck glands.  · You have pain while swallowing.  · You have white areas in the back of your throat.  Get help right away if:  · You have severe or persistent:  ¨ Headache.  ¨ Ear pain.  ¨ Sinus pain.  ¨ Chest pain.  · You have chronic lung disease and any of the following:  ¨ Wheezing.  ¨ Prolonged cough.  ¨ Coughing up blood.  ¨ A change in your usual mucus.  · You have a stiff neck.  · You have changes in your:  ¨ Vision.  ¨ Hearing.  ¨ Thinking.  ¨ Mood.  This information is not intended to replace advice given to you by your health care provider. Make sure you discuss any questions you have with your health care provider.  Document Released: 06/13/2002 Document Revised: 08/20/2017 Document Reviewed: 03/25/2015  [employer] Interactive Patient Education © 2017 [employer]

## 2018-05-12 LAB
S PYO SPEC QL CULT: NORMAL
SIGNIFICANT IND 70042: NORMAL
SITE SITE: NORMAL
SOURCE SOURCE: NORMAL

## 2019-01-03 ENCOUNTER — OFFICE VISIT (OUTPATIENT)
Dept: CARDIOLOGY | Facility: MEDICAL CENTER | Age: 59
End: 2019-01-03
Payer: MEDICARE

## 2019-01-03 VITALS
RESPIRATION RATE: 12 BRPM | OXYGEN SATURATION: 98 % | DIASTOLIC BLOOD PRESSURE: 74 MMHG | BODY MASS INDEX: 33.86 KG/M2 | HEIGHT: 62 IN | SYSTOLIC BLOOD PRESSURE: 140 MMHG | WEIGHT: 184 LBS | HEART RATE: 72 BPM

## 2019-01-03 DIAGNOSIS — I42.1 HYPERTROPHIC OBSTRUCTIVE CARDIOMYOPATHY (HCC): ICD-10-CM

## 2019-01-03 DIAGNOSIS — I10 ESSENTIAL HYPERTENSION: Primary | ICD-10-CM

## 2019-01-03 DIAGNOSIS — E78.2 MIXED HYPERLIPIDEMIA: ICD-10-CM

## 2019-01-03 PROCEDURE — 99214 OFFICE O/P EST MOD 30 MIN: CPT | Performed by: NURSE PRACTITIONER

## 2019-01-03 RX ORDER — DILTIAZEM HYDROCHLORIDE 120 MG/1
120 CAPSULE, COATED, EXTENDED RELEASE ORAL DAILY
Qty: 90 CAP | Refills: 3 | Status: SHIPPED | OUTPATIENT
Start: 2019-01-03

## 2019-01-03 RX ORDER — ERGOCALCIFEROL 1.25 MG/1
CAPSULE ORAL
COMMUNITY

## 2019-01-03 RX ORDER — ATORVASTATIN CALCIUM 80 MG/1
80 TABLET, FILM COATED ORAL EVERY EVENING
Qty: 90 TAB | Refills: 3 | Status: SHIPPED | OUTPATIENT
Start: 2019-01-03

## 2019-01-03 RX ORDER — LISINOPRIL 5 MG/1
5 TABLET ORAL EVERY EVENING
Qty: 30 TAB | Refills: 11 | Status: SHIPPED | OUTPATIENT
Start: 2019-01-03 | End: 2019-03-15

## 2019-01-03 RX ORDER — METOPROLOL SUCCINATE 100 MG/1
100 TABLET, EXTENDED RELEASE ORAL DAILY
Qty: 90 TAB | Refills: 3 | Status: SHIPPED | OUTPATIENT
Start: 2019-01-03 | End: 2019-04-11

## 2019-01-03 ASSESSMENT — ENCOUNTER SYMPTOMS
COUGH: 0
CLAUDICATION: 0
ABDOMINAL PAIN: 0
MYALGIAS: 0
SHORTNESS OF BREATH: 0
WEAKNESS: 0
DIZZINESS: 0
PND: 0
ORTHOPNEA: 0
PALPITATIONS: 1

## 2019-01-03 NOTE — PROGRESS NOTES
Chief Complaint   Patient presents with   • HTN (Controlled)       Subjective:   Kizzy Chapman is a 58 y.o. female who presents today to follow-up on hypertension, hyperlipidemia and hypertrophic cardiomyopathy.    She was last seen by Dr. Arriaga on March 7, 2018.  At that time he doubled the dose of her diltiazem to 40 mg to twice a day.  She was also on a large dose of metoprolol.  He did this due to high blood pressure and history of hypertrophic cardiomyopathy.  Few days later the patient did not feel well and presented to the emergency room at [facility] where she was found to be very bradycardic.  A temporary pacemaker was placed.  Her heart rate recovered and her medications were adjusted.  Records from [facility] had been previously requested but never received.    Currently, she is concerned about her blood pressure as it has been running 140/90 at home.  She checks this after resting for 15 minutes and uses a arm cuff.    She states she feels generally well and denies any chest discomfort.  Occasionally she will have a skipped heartbeat but no sustained palpitations.  She has a history of WPW ablation back in 1997.    She denies any shortness of breath, orthopnea or PND.  No ankle edema.    Past Medical History:   Diagnosis Date   • Anxiety    • Diabetes (HCC)    • Hypercholesterolemia    • Hypertension    • Hypertrophic cardiomyopathy (HCC)    • Hypertrophic obstructive cardiomyopathy(425.11) 4/1/2015   • Obesity    • Psychiatric disorder      Past Surgical History:   Procedure Laterality Date   • OTHER CARDIAC SURGERY      ablation of heart for WPW   • OTHER ORTHOPEDIC SURGERY      tib fib with pins; left total hip replacement     History reviewed. No pertinent family history.  Social History     Social History   • Marital status: Single     Spouse name: N/A   • Number of children: N/A   • Years of education: N/A     Occupational History   • Not on file.     Social History Main Topics   •  Smoking status: Former Smoker     Packs/day: 0.10     Years: 10.00     Types: Cigarettes     Quit date: 4/27/2015   • Smokeless tobacco: Never Used   • Alcohol use Yes      Comment: occasional   • Drug use: No   • Sexual activity: Not on file     Other Topics Concern   • Not on file     Social History Narrative   • No narrative on file     Allergies   Allergen Reactions   • Epinephrine      Tightness in arms and legs     Outpatient Encounter Prescriptions as of 1/3/2019   Medication Sig Dispense Refill   • vitamin D, Ergocalciferol, (DRISDOL) 71556 units Cap capsule Take  by mouth every 7 days.     • DILTIAZem CD (CARDIZEM CD) 120 MG CAPSULE SR 24 HR Take 1 Cap by mouth every day. 90 Cap 3   • metoprolol SR (TOPROL XL) 100 MG TABLET SR 24 HR Take 1 Tab by mouth every day. 90 Tab 3   • lisinopril (PRINIVIL) 5 MG Tab Take 1 Tab by mouth every evening. 30 Tab 11   • atorvastatin (LIPITOR) 80 MG tablet Take 1 Tab by mouth every evening. 90 Tab 3   • metformin (GLUCOPHAGE) 1000 MG tablet Take 1 Tab by mouth 2 times a day, with meals. 60 Tab 0   • insulin aspart (NOVOLOG) 100 UNIT/ML Solution Inject  as instructed 3 times a day before meals.     • MAGNESIUM PO Take  by mouth.     • insulin glargine (LANTUS) 100 UNIT/ML SOLN Inject 30 Units as instructed every morning. Based on carbs.      • Calcium Carbonate-Vitamin D (CALCIUM + D PO) Take 1 Tab by mouth 2 Times a Day.     • B Complex Vitamins (VITAMIN B COMPLEX PO) Take 1 Tab by mouth every day.     • diazePAM (VALIUM) 5 MG Tab Take 5 mg by mouth every bedtime.     • [DISCONTINUED] atorvastatin (LIPITOR) 80 MG tablet TAKE 1 TABLET BY MOUTH EVERY DAY 90 Tab 3   • [DISCONTINUED] metoprolol (LOPRESSOR) 100 MG Tab Take 1 Tab by mouth 2 times a day. (Patient taking differently: Take 150 mg by mouth every day.) 180 Tab 3   • [DISCONTINUED] Insulin Lispro (HUMALOG SC) Inject  as instructed.     • [DISCONTINUED] DILTIAZem CD (CARDIZEM CD) 240 MG CAPSULE SR 24 HR Take 1 Cap by  "mouth 2 Times a Day. (Patient taking differently: Take 120 mg by mouth every day.) 180 Cap 3   • [DISCONTINUED] Cholecalciferol (VITAMIN D) 400 UNIT Tab Take 400 Units by mouth 2 Times a Day.     • [DISCONTINUED] clonidine (CATAPRES) 0.1 MG Tab Take 1 Tab by mouth as needed (Elevated BP >160; up to TID). (Patient not taking: Reported on 1/3/2019) 90 Tab 3     No facility-administered encounter medications on file as of 1/3/2019.      Review of Systems   Constitutional: Negative for malaise/fatigue.   Respiratory: Negative for cough and shortness of breath.    Cardiovascular: Positive for palpitations (occasional skip). Negative for chest pain, orthopnea, claudication, leg swelling and PND.   Gastrointestinal: Negative for abdominal pain.   Musculoskeletal: Negative for myalgias.   Neurological: Negative for dizziness and weakness.        Objective:   /74 (BP Location: Left arm, Patient Position: Sitting, BP Cuff Size: Adult)   Pulse 72   Resp 12   Ht 1.575 m (5' 2\")   Wt 83.5 kg (184 lb)   SpO2 98%   BMI 33.65 kg/m²     Physical Exam   Constitutional: She is oriented to person, place, and time. She appears well-developed and well-nourished.   HENT:   Head: Normocephalic.   Eyes: EOM are normal.   Neck: No JVD present.   Cardiovascular: Normal rate, regular rhythm and normal heart sounds.    Pulmonary/Chest: Effort normal and breath sounds normal.   Abdominal: Soft. Bowel sounds are normal.   Central obesity.   Musculoskeletal: She exhibits no edema.   Neurological: She is alert and oriented to person, place, and time.   Skin: Skin is warm and dry.   Psychiatric: She has a normal mood and affect.     June 9, 2018: Lipids: Cholesterol 128, triglycerides 196, HDL 33, LDL 56.  Hemoglobin A1c 9.2.      April 21, 2017: Transthoracic Echo Report  Normal left ventricular systolic function.  Mild concentric left ventricular hypertrophy.  LVOT gradient (HOCM) with a peak of 20 mmHg.  Aortic sclerosis without " stenosis.  Compared to the images of the prior study done -  left ventricular wall   thickness appears marginally better, otherwise the study is unchanged.    Assessment:     1. Essential hypertension  DILTIAZem CD (CARDIZEM CD) 120 MG CAPSULE SR 24 HR    metoprolol SR (TOPROL XL) 100 MG TABLET SR 24 HR    lisinopril (PRINIVIL) 5 MG Tab   2. Mixed hyperlipidemia     3. Hypertrophic obstructive cardiomyopathy (CMS-HCC)  EC-ECHOCARDIOGRAM COMPLETE W/O CONT       Medical Decision Making:  Today's Assessment / Status / Plan:   Hypertension: Her blood pressure is excellent in the office today.  However it appears she had some bradycardia which was probably medication induced from a combination of diltiazem and metoprolol.  I will have her continue diltiazem 120 mg daily.  I will reduce metoprolol succinate to 100 mg daily.    Since she is diabetic, she should be on an ACE or an ARB.  I will add lisinopril 5 mg daily.  We will monitor her blood pressure morning and evening and contact us in approximately 2 weeks with her I expect that we will need to increase the lisinopril.    Hyperlipidemia: Last lipids were good.  However she will be due for labs soon.  We will do lipid and metabolic panel prior to her next follow-up appointment.    Hypertrophic cardiomyopathy: Last echocardiogram was April 2017.  Her LVOT gradient was only 20.  She appears to be asymptomatic.  We will check an echocardiogram prior to her follow-up visit.    Patient would like to establish with a different cardiologist.  She is no longer going to be seen at [facility].  I will have her establish with Dr. Cat Manuel in approximately 3 months.  Patient will do echo and lab prior to her visit.  She will follow-up sooner if problems.    Collaborating Provider: Dr. Aristides Wiggins.

## 2019-01-03 NOTE — LETTER
[facility]   [address] [place] [address] [address] [address]  Phone: [phone]  Fax: [phone]              Kizzy Chapman  1960    Encounter Date: 1/3/2019    WAYNE Keating          PROGRESS NOTE:  Chief Complaint   Patient presents with   • HTN (Controlled)       Subjective:   Kizzy Chapman is a 58 y.o. female who presents today to follow-up on hypertension, hyperlipidemia and hypertrophic cardiomyopathy.    She was last seen by Dr. Arriaga on March 7, 2018.  At that time he doubled the dose of her diltiazem to 40 mg to twice a day.  She was also on a large dose of metoprolol.  He did this due to high blood pressure and history of hypertrophic cardiomyopathy.  Few days later the patient did not feel well and presented to the emergency room at [facility] where she was found to be very bradycardic.  A temporary pacemaker was placed.  Her heart rate recovered and her medications were adjusted.  Records from [facility] had been previously requested but never received.    Currently, she is concerned about her blood pressure as it has been running 140/90 at home.  She checks this after resting for 15 minutes and uses a arm cuff.    She states she feels generally well and denies any chest discomfort.  Occasionally she will have a skipped heartbeat but no sustained palpitations.  She has a history of WPW ablation back in 1997.    She denies any shortness of breath, orthopnea or PND.  No ankle edema.    Past Medical History:   Diagnosis Date   • Anxiety    • Diabetes (HCC)    • Hypercholesterolemia    • Hypertension    • Hypertrophic cardiomyopathy (HCC)    • Hypertrophic obstructive cardiomyopathy(425.11) 4/1/2015   • Obesity    • Psychiatric disorder      Past Surgical History:   Procedure Laterality Date   • OTHER CARDIAC SURGERY      ablation of heart for WPW   • OTHER ORTHOPEDIC SURGERY      tib fib with pins; left total hip replacement        History reviewed. No pertinent family history.  Social History     Social History   • Marital status: Single     Spouse name: N/A   • Number of children: N/A   • Years of education: N/A     Occupational History   • Not on file.     Social History Main Topics   • Smoking status: Former Smoker     Packs/day: 0.10     Years: 10.00     Types: Cigarettes     Quit date: 4/27/2015   • Smokeless tobacco: Never Used   • Alcohol use Yes      Comment: occasional   • Drug use: No   • Sexual activity: Not on file     Other Topics Concern   • Not on file     Social History Narrative   • No narrative on file     Allergies   Allergen Reactions   • Epinephrine      Tightness in arms and legs     Outpatient Encounter Prescriptions as of 1/3/2019   Medication Sig Dispense Refill   • vitamin D, Ergocalciferol, (DRISDOL) 98880 units Cap capsule Take  by mouth every 7 days.     • DILTIAZem CD (CARDIZEM CD) 120 MG CAPSULE SR 24 HR Take 1 Cap by mouth every day. 90 Cap 3   • metoprolol SR (TOPROL XL) 100 MG TABLET SR 24 HR Take 1 Tab by mouth every day. 90 Tab 3   • lisinopril (PRINIVIL) 5 MG Tab Take 1 Tab by mouth every evening. 30 Tab 11   • atorvastatin (LIPITOR) 80 MG tablet Take 1 Tab by mouth every evening. 90 Tab 3   • metformin (GLUCOPHAGE) 1000 MG tablet Take 1 Tab by mouth 2 times a day, with meals. 60 Tab 0   • insulin aspart (NOVOLOG) 100 UNIT/ML Solution Inject  as instructed 3 times a day before meals.     • MAGNESIUM PO Take  by mouth.     • insulin glargine (LANTUS) 100 UNIT/ML SOLN Inject 30 Units as instructed every morning. Based on carbs.      • Calcium Carbonate-Vitamin D (CALCIUM + D PO) Take 1 Tab by mouth 2 Times a Day.     • B Complex Vitamins (VITAMIN B COMPLEX PO) Take 1 Tab by mouth every day.     • diazePAM (VALIUM) 5 MG Tab Take 5 mg by mouth every bedtime.     • [DISCONTINUED] atorvastatin (LIPITOR) 80 MG tablet TAKE 1 TABLET BY MOUTH EVERY DAY 90 Tab 3   • [DISCONTINUED] metoprolol (LOPRESSOR) 100 MG  "Tab Take 1 Tab by mouth 2 times a day. (Patient taking differently: Take 150 mg by mouth every day.) 180 Tab 3   • [DISCONTINUED] Insulin Lispro (HUMALOG SC) Inject  as instructed.     • [DISCONTINUED] DILTIAZem CD (CARDIZEM CD) 240 MG CAPSULE SR 24 HR Take 1 Cap by mouth 2 Times a Day. (Patient taking differently: Take 120 mg by mouth every day.) 180 Cap 3   • [DISCONTINUED] Cholecalciferol (VITAMIN D) 400 UNIT Tab Take 400 Units by mouth 2 Times a Day.     • [DISCONTINUED] clonidine (CATAPRES) 0.1 MG Tab Take 1 Tab by mouth as needed (Elevated BP >160; up to TID). (Patient not taking: Reported on 1/3/2019) 90 Tab 3     No facility-administered encounter medications on file as of 1/3/2019.      Review of Systems   Constitutional: Negative for malaise/fatigue.   Respiratory: Negative for cough and shortness of breath.    Cardiovascular: Positive for palpitations (occasional skip). Negative for chest pain, orthopnea, claudication, leg swelling and PND.   Gastrointestinal: Negative for abdominal pain.   Musculoskeletal: Negative for myalgias.   Neurological: Negative for dizziness and weakness.        Objective:   /74 (BP Location: Left arm, Patient Position: Sitting, BP Cuff Size: Adult)   Pulse 72   Resp 12   Ht 1.575 m (5' 2\")   Wt 83.5 kg (184 lb)   SpO2 98%   BMI 33.65 kg/m²      Physical Exam   Constitutional: She is oriented to person, place, and time. She appears well-developed and well-nourished.   HENT:   Head: Normocephalic.   Eyes: EOM are normal.   Neck: No JVD present.   Cardiovascular: Normal rate, regular rhythm and normal heart sounds.    Pulmonary/Chest: Effort normal and breath sounds normal.   Abdominal: Soft. Bowel sounds are normal.   Central obesity.   Musculoskeletal: She exhibits no edema.   Neurological: She is alert and oriented to person, place, and time.   Skin: Skin is warm and dry.   Psychiatric: She has a normal mood and affect.     June 9, 2018: Lipids: Cholesterol 128, " triglycerides 196, HDL 33, LDL 56.  Hemoglobin A1c 9.2.      April 21, 2017: Transthoracic Echo Report  Normal left ventricular systolic function.  Mild concentric left ventricular hypertrophy.  LVOT gradient (HOCM) with a peak of 20 mmHg.  Aortic sclerosis without stenosis.  Compared to the images of the prior study done -  left ventricular wall   thickness appears marginally better, otherwise the study is unchanged.    Assessment:     1. Essential hypertension  DILTIAZem CD (CARDIZEM CD) 120 MG CAPSULE SR 24 HR    metoprolol SR (TOPROL XL) 100 MG TABLET SR 24 HR    lisinopril (PRINIVIL) 5 MG Tab   2. Mixed hyperlipidemia     3. Hypertrophic obstructive cardiomyopathy (CMS-HCC)  EC-ECHOCARDIOGRAM COMPLETE W/O CONT       Medical Decision Making:  Today's Assessment / Status / Plan:   Hypertension: Her blood pressure is excellent in the office today.  However it appears she had some bradycardia which was probably medication induced from a combination of diltiazem and metoprolol.  I will have her continue diltiazem 120 mg daily.  I will reduce metoprolol succinate to 100 mg daily.    Since she is diabetic, she should be on an ACE or an ARB.  I will add lisinopril 5 mg daily.  We will monitor her blood pressure morning and evening and contact us in approximately 2 weeks with her I expect that we will need to increase the lisinopril.    Hyperlipidemia: Last lipids were good.  However she will be due for labs soon.  We will do lipid and metabolic panel prior to her next follow-up appointment.    Hypertrophic cardiomyopathy: Last echocardiogram was April 2017.  Her LVOT gradient was only 20.  She appears to be asymptomatic.  We will check an echocardiogram prior to her follow-up visit.    Patient would like to establish with a different cardiologist.  She is no longer going to be seen at [facility].  I will have her establish with Dr. Cat Manuel in approximately 3 months.  Patient will do echo and  lab prior to her visit.  She will follow-up sooner if problems.    Collaborating Provider: Dr. Aristides Wiggins.        No Recipients

## 2019-02-22 ENCOUNTER — HOSPITAL ENCOUNTER (OUTPATIENT)
Dept: CARDIOLOGY | Facility: MEDICAL CENTER | Age: 59
End: 2019-02-22
Attending: NURSE PRACTITIONER
Payer: MEDICARE

## 2019-02-22 LAB
LV EJECT FRACT  99904: 65
LV EJECT FRACT MOD 2C 99903: 58.76
LV EJECT FRACT MOD 4C 99902: 69.36
LV EJECT FRACT MOD BP 99901: 64.39

## 2019-02-22 PROCEDURE — 93306 TTE W/DOPPLER COMPLETE: CPT

## 2019-02-22 PROCEDURE — 93306 TTE W/DOPPLER COMPLETE: CPT | Mod: 26 | Performed by: INTERNAL MEDICINE

## 2019-03-14 ENCOUNTER — TELEPHONE (OUTPATIENT)
Dept: CARDIOLOGY | Facility: MEDICAL CENTER | Age: 59
End: 2019-03-14

## 2019-03-14 NOTE — TELEPHONE ENCOUNTER
I tried to LM for remind patient complete fasting labs before appt with Dr. JANNETTE Manuel, but the VM box was not set up and I was unable to LM.

## 2019-03-15 ENCOUNTER — OFFICE VISIT (OUTPATIENT)
Dept: CARDIOLOGY | Facility: MEDICAL CENTER | Age: 59
End: 2019-03-15
Payer: MEDICARE

## 2019-03-15 VITALS
WEIGHT: 182.76 LBS | HEART RATE: 78 BPM | SYSTOLIC BLOOD PRESSURE: 136 MMHG | DIASTOLIC BLOOD PRESSURE: 80 MMHG | OXYGEN SATURATION: 95 % | BODY MASS INDEX: 33.63 KG/M2 | HEIGHT: 62 IN

## 2019-03-15 DIAGNOSIS — R06.09 DOE (DYSPNEA ON EXERTION): ICD-10-CM

## 2019-03-15 DIAGNOSIS — I51.7 LVH (LEFT VENTRICULAR HYPERTROPHY): ICD-10-CM

## 2019-03-15 DIAGNOSIS — I42.1 OBSTRUCTIVE HYPERTROPHIC CARDIOMYOPATHY (HCC): ICD-10-CM

## 2019-03-15 DIAGNOSIS — I10 ESSENTIAL HYPERTENSION: ICD-10-CM

## 2019-03-15 DIAGNOSIS — I45.10 INCOMPLETE RBBB: ICD-10-CM

## 2019-03-15 DIAGNOSIS — E11.65 TYPE 2 DIABETES MELLITUS WITH HYPERGLYCEMIA, WITHOUT LONG-TERM CURRENT USE OF INSULIN (HCC): ICD-10-CM

## 2019-03-15 DIAGNOSIS — E11.69 DIABETES MELLITUS TYPE 2 IN OBESE (HCC): ICD-10-CM

## 2019-03-15 DIAGNOSIS — I42.1 HYPERTROPHIC OBSTRUCTIVE CARDIOMYOPATHY (HCC): ICD-10-CM

## 2019-03-15 DIAGNOSIS — E78.00 PURE HYPERCHOLESTEROLEMIA: ICD-10-CM

## 2019-03-15 DIAGNOSIS — E78.2 MIXED HYPERLIPIDEMIA: ICD-10-CM

## 2019-03-15 DIAGNOSIS — I45.6 WPW (WOLFF-PARKINSON-WHITE SYNDROME): ICD-10-CM

## 2019-03-15 DIAGNOSIS — E66.9 DIABETES MELLITUS TYPE 2 IN OBESE (HCC): ICD-10-CM

## 2019-03-15 DIAGNOSIS — Z82.49 FAMILY HISTORY OF CORONARY ARTERY DISEASE IN FATHER: ICD-10-CM

## 2019-03-15 PROCEDURE — 99214 OFFICE O/P EST MOD 30 MIN: CPT | Performed by: INTERNAL MEDICINE

## 2019-03-15 NOTE — LETTER
[facility]   [address]  [place] [address]  Phone: [phone]  Fax: [phone]              Kizzy Chapman  1960    Encounter Date: 3/15/2019    Cat Manuel M.D.          PROGRESS NOTE:  Subjective:   Chief Complaint:   Chief Complaint   Patient presents with   • Hypertension       Kizzy Chapman is a 58 y.o. female who returns today for follow-up of hypertension and arrhythmia.  She is felt to be on the spectrum of hypertrophic cardiomyopathy without significant obstruction, LVOT gradient of only 20 mmHg.      Has a history of WPW with ablation, [place] 1996, at age 36.  Struggles with palpitations and PVCs. Will feel like a butterfly in her chest, then feels like it stops beating.  Going up on BB and CCB didn't change this.  Zio patch in 2015 showed PVCs and 4 beats of nonsustained VT, low risk.  Has an incomplete right bundle branch block and rightward axis versus left posterior fascicular block.  Has had syncope in the past.    She has hypertension requiring multiple medications, first diagnosed about 10 years ago, controlled at today's visit.  She has hyperlipidemia, LDL 56, HDL is low at 36.  Has type 2 diabetes, may have been back to childhood, had gestational DM at 24, not controlled.  Father had MI during colon cancer, mother had stents in her 70s.    She is being titrated on beta-blockers and calcium channel blockers and was admitted to [facility] March 2018 with bradycardia.  Medications were held and then she was restarted on metoprolol alone.  Then our office restarted cardizem.  Had a nuclear stress test at that time that showed normal perfusion.  Per their echo interpretation she has concentric LVH.    Once in a while will have dizziness with bending over, coming up but not limiting, no syncope.  No CP. Limited by hernia in groin, does not do trash or heavy lifting and carrying.  Mild MEYER with  over exerting and moving quickly at work.    Prior:  -Amlodipine- caused flushing    Has some PRN hydralazine at home already    DATA REVIEWED by me:  ECG 2017  Sinus bradycardia, rate 51, rightward axis, incomplete right bundle branch block    Echo 2019  EF 65%, RVSP 30 mmHg    Chest x-ray 5/10/2018  No acute cardiopulmonary process    Zio patch 2015 which showed PVCs, 4 beats of nonsustained VT.  Symptoms of lightheadedness and palpitations occurred in sinus with occasional PVCs.    Nuclear stress test at [place] 2018  Normal perfusion, EF 73%    Echocardiogram [facility] 2018  Mild to moderate concentric LVH, EF greater than 70%, hyperdynamic wall motion, stage II diastolic dysfunction, mild TR, RVSP 37 mmHg    Most recent labs:     2018  Magnesium 1.1, glucose 120, creatinine 0.59, , sodium 139, potassium 5.4, albumin 3.9, LFTs normal, total cholesterol 128, triglycerides 196, HDL 33, LDL 56, hemoglobin A1c is 9.2    2017 hemoglobin 12.3, pulse 262, sodium 132, acid 5.1, creatinine 0.85, LFTs normal, troponin normal, BNP 39    2017 total cholesterol 232, triglyceride 515, HDL 43, LDL could not be calculated    Past Medical History:   Diagnosis Date   • Anxiety    • Diabetes (HCC)    • Hypercholesterolemia    • Hypertension    • Hypertrophic cardiomyopathy (HCC)    • Hypertrophic obstructive cardiomyopathy(425.11) 2015   • Obesity    • Psychiatric disorder      Past Surgical History:   Procedure Laterality Date   • OTHER CARDIAC SURGERY      ablation of heart for WPW   • OTHER ORTHOPEDIC SURGERY      tib fib with pins; left total hip replacement     Family History   Problem Relation Age of Onset   • Heart Attack Mother 73        3 stents, alive at 91   • Heart Attack Father 58         of MI   • Colon Cancer Father 58        on chemo when MI happened   • Other Brother          of MVA at 44, had lymphoma from agent orange       Social History     Social History   • Marital status: Single     Spouse name: N/A   • Number of children: N/A   • Years of education: N/A     Occupational History   • Not on file.     Social History Main Topics   • Smoking status: Former Smoker     Packs/day: 0.10     Years: 10.00     Types: Cigarettes     Quit date: 4/27/2015   • Smokeless tobacco: Never Used   • Alcohol use Yes      Comment: occasional   • Drug use: No   • Sexual activity: Not on file     Other Topics Concern   • Not on file     Social History Narrative   • No narrative on file     Allergies   Allergen Reactions   • Epinephrine      Tightness in arms and legs       Current Outpatient Prescriptions   Medication Sig Dispense Refill   • aspirin EC (ECOTRIN) 81 MG Tablet Delayed Response Take 1 Tab by mouth every day. 30 Tab 0   • vitamin D, Ergocalciferol, (DRISDOL) 87082 units Cap capsule Take  by mouth every 7 days.     • DILTIAZem CD (CARDIZEM CD) 120 MG CAPSULE SR 24 HR Take 1 Cap by mouth every day. 90 Cap 3   • metoprolol SR (TOPROL XL) 100 MG TABLET SR 24 HR Take 1 Tab by mouth every day. (Patient taking differently: Take 200 mg by mouth every day.) 90 Tab 3   • atorvastatin (LIPITOR) 80 MG tablet Take 1 Tab by mouth every evening. 90 Tab 3   • metformin (GLUCOPHAGE) 1000 MG tablet Take 1 Tab by mouth 2 times a day, with meals. 60 Tab 0   • insulin aspart (NOVOLOG) 100 UNIT/ML Solution Inject  as instructed 3 times a day before meals.     • MAGNESIUM PO Take  by mouth.     • insulin glargine (LANTUS) 100 UNIT/ML SOLN Inject 30 Units as instructed every morning. Based on carbs.      • Calcium Carbonate-Vitamin D (CALCIUM + D PO) Take 1 Tab by mouth 2 Times a Day.     • B Complex Vitamins (VITAMIN B COMPLEX PO) Take 1 Tab by mouth every day.     • diazePAM (VALIUM) 5 MG Tab Take 5 mg by mouth every bedtime.       No current facility-administered medications for this visit.        ROS  All others systems reviewed and negative.     Objective:  "    Blood pressure 136/80, pulse 78, height 1.575 m (5' 2\"), weight 82.9 kg (182 lb 12.2 oz), SpO2 95 %. Body mass index is 33.43 kg/m².    Physical Exam   General: No acute distress. Well nourished.  HEENT: EOM grossly intact, no scleral icterus, no pharyngeal erythema.   Neck:  No JVD, no bruits, trachea midline  CVS: RRR. Normal S1, S2, +S3. No ambrocio M/R/G. No LE edema.  2+ radial pulses, 2+ DP pulses  Resp: CTAB. No wheezing or crackles/rhonchi. Normal respiratory effort.  Abdomen: Soft, NT, no ambrocio hepatomegaly, obese.  MSK/Ext: No clubbing or cyanosis.  Skin: Warm and dry, no rashes.  Neurological: CN III-XII grossly intact. No focal deficits.   Psych: A&O x 3, appropriate affect, good judgement      Assessment:     1. Essential hypertension     2. Mixed hyperlipidemia     3. Hypertrophic obstructive cardiomyopathy (CMS-HCC)     4. WPW (Ernesto-Parkinson-White syndrome) s/p ablation 1997     5. Type 2 diabetes mellitus with hyperglycemia, without long-term current use of insulin (HCC)     6. Diabetes mellitus type 2 in obese (HCC)     7. Pure hypercholesterolemia     8. Incomplete RBBB     9. LVH (left ventricular hypertrophy)     10. Family history of coronary artery disease in father     11. MEYER (dyspnea on exertion)     12. Obstructive hypertrophic cardiomyopathy (HCC)  MR-CARDIAC MORPH/FUNC WITH & W/O       Medical Decision Making:  Today's Assessment / Status / Plan:     -I do not think she actually had HCM but rather LVH from DM and HTN, will get cardiac MRI  -BP and DM control is key  -Has some PRN hydralazine at home already  -Does not need aggressive HR control  -Has risk factors for CAD, start ASA  -She is a responder to lipitor    Written instructions given today:  -Cardiac MRI to look at the heart muscle to see if this is concentric left ventricular hypertrophy related to DM and HTN or actual HCM.  -BP control, want to keep is in general under 130/80, if you are not able to keep it in this range, " send me a [employer] message with your blood pressures and we can adjust meds over the phone or via messaging.  -I would consider adding HCTZ again at 12.5 mg, I would need to check some blood work after to monitor potassium  -You should always hear results of testing within 5 days with my interpretation, if you do not, send a [employer] message or call the office: [phone].      Return in about 6 months (around 9/15/2019).    It is my pleasure to participate in the care of Ms. Chapman.  Please do not hesitate to contact me with questions or concerns.    Cat Manuel MD, [facility]  Cardiologist [facility]    Please note that this dictation was created using voice recognition software. I have made every reasonable attempt to correct obvious errors, but it is possible there are errors of grammar and possibly content that I did not discover before finalizing the note.      Madonna Jaimes M.D.  [address]  [address] [address]  VIA Facsimile: [phone]

## 2019-03-15 NOTE — PROGRESS NOTES
Subjective:   Chief Complaint:   Chief Complaint   Patient presents with   • Hypertension       Kizzy Chapman is a 58 y.o. female who returns today for follow-up of hypertension and arrhythmia.  She is felt to be on the spectrum of hypertrophic cardiomyopathy without significant obstruction, LVOT gradient of only 20 mmHg.      Has a history of WPW with ablation, [place] 1996, at age 36.  Struggles with palpitations and PVCs. Will feel like a butterfly in her chest, then feels like it stops beating.  Going up on BB and CCB didn't change this.  Zio patch in 2015 showed PVCs and 4 beats of nonsustained VT, low risk.  Has an incomplete right bundle branch block and rightward axis versus left posterior fascicular block.  Has had syncope in the past.    She has hypertension requiring multiple medications, first diagnosed about 10 years ago, controlled at today's visit.  She has hyperlipidemia, LDL 56, HDL is low at 36.  Has type 2 diabetes, may have been back to childhood, had gestational DM at 24, not controlled.  Father had MI during colon cancer, mother had stents in her 70s.    She is being titrated on beta-blockers and calcium channel blockers and was admitted to [facility] March 2018 with bradycardia.  Medications were held and then she was restarted on metoprolol alone.  Then our office restarted cardizem.  Had a nuclear stress test at that time that showed normal perfusion.  Per their echo interpretation she has concentric LVH.    Once in a while will have dizziness with bending over, coming up but not limiting, no syncope.  No CP. Limited by hernia in groin, does not do trash or heavy lifting and carrying.  Mild MEYER with over exerting and moving quickly at work.    Prior:  -Amlodipine- caused flushing    Has some PRN hydralazine at home already    DATA REVIEWED by me:  ECG 9/9/2017  Sinus bradycardia, rate 51, rightward axis, incomplete right bundle branch block    Echo 2/22/2019  EF  65%, RVSP 30 mmHg    Chest x-ray 5/10/2018  No acute cardiopulmonary process    Zio patch 2015 which showed PVCs, 4 beats of nonsustained VT.  Symptoms of lightheadedness and palpitations occurred in sinus with occasional PVCs.    Nuclear stress test at [place] 2018  Normal perfusion, EF 73%    Echocardiogram [facility] 2018  Mild to moderate concentric LVH, EF greater than 70%, hyperdynamic wall motion, stage II diastolic dysfunction, mild TR, RVSP 37 mmHg    Most recent labs:     2018  Magnesium 1.1, glucose 120, creatinine 0.59, , sodium 139, potassium 5.4, albumin 3.9, LFTs normal, total cholesterol 128, triglycerides 196, HDL 33, LDL 56, hemoglobin A1c is 9.2    2017 hemoglobin 12.3, pulse 262, sodium 132, acid 5.1, creatinine 0.85, LFTs normal, troponin normal, BNP 39    2017 total cholesterol 232, triglyceride 515, HDL 43, LDL could not be calculated    Past Medical History:   Diagnosis Date   • Anxiety    • Diabetes (HCC)    • Hypercholesterolemia    • Hypertension    • Hypertrophic cardiomyopathy (HCC)    • Hypertrophic obstructive cardiomyopathy(425.11) 2015   • Obesity    • Psychiatric disorder      Past Surgical History:   Procedure Laterality Date   • OTHER CARDIAC SURGERY      ablation of heart for WPW   • OTHER ORTHOPEDIC SURGERY      tib fib with pins; left total hip replacement     Family History   Problem Relation Age of Onset   • Heart Attack Mother 73        3 stents, alive at 91   • Heart Attack Father 58         of MI   • Colon Cancer Father 58        on chemo when MI happened   • Other Brother          of MVA at 44, had lymphoma from agent orange     Social History     Social History   • Marital status: Single     Spouse name: N/A   • Number of children: N/A   • Years of education: N/A     Occupational History   • Not on file.     Social History Main Topics   • Smoking status: Former Smoker     Packs/day: 0.10  "    Years: 10.00     Types: Cigarettes     Quit date: 4/27/2015   • Smokeless tobacco: Never Used   • Alcohol use Yes      Comment: occasional   • Drug use: No   • Sexual activity: Not on file     Other Topics Concern   • Not on file     Social History Narrative   • No narrative on file     Allergies   Allergen Reactions   • Epinephrine      Tightness in arms and legs       Current Outpatient Prescriptions   Medication Sig Dispense Refill   • aspirin EC (ECOTRIN) 81 MG Tablet Delayed Response Take 1 Tab by mouth every day. 30 Tab 0   • vitamin D, Ergocalciferol, (DRISDOL) 31238 units Cap capsule Take  by mouth every 7 days.     • DILTIAZem CD (CARDIZEM CD) 120 MG CAPSULE SR 24 HR Take 1 Cap by mouth every day. 90 Cap 3   • metoprolol SR (TOPROL XL) 100 MG TABLET SR 24 HR Take 1 Tab by mouth every day. (Patient taking differently: Take 200 mg by mouth every day.) 90 Tab 3   • atorvastatin (LIPITOR) 80 MG tablet Take 1 Tab by mouth every evening. 90 Tab 3   • metformin (GLUCOPHAGE) 1000 MG tablet Take 1 Tab by mouth 2 times a day, with meals. 60 Tab 0   • insulin aspart (NOVOLOG) 100 UNIT/ML Solution Inject  as instructed 3 times a day before meals.     • MAGNESIUM PO Take  by mouth.     • insulin glargine (LANTUS) 100 UNIT/ML SOLN Inject 30 Units as instructed every morning. Based on carbs.      • Calcium Carbonate-Vitamin D (CALCIUM + D PO) Take 1 Tab by mouth 2 Times a Day.     • B Complex Vitamins (VITAMIN B COMPLEX PO) Take 1 Tab by mouth every day.     • diazePAM (VALIUM) 5 MG Tab Take 5 mg by mouth every bedtime.       No current facility-administered medications for this visit.        ROS  All others systems reviewed and negative.     Objective:     Blood pressure 136/80, pulse 78, height 1.575 m (5' 2\"), weight 82.9 kg (182 lb 12.2 oz), SpO2 95 %. Body mass index is 33.43 kg/m².    Physical Exam   General: No acute distress. Well nourished.  HEENT: EOM grossly intact, no scleral icterus, no pharyngeal " erythema.   Neck:  No JVD, no bruits, trachea midline  CVS: RRR. Normal S1, S2, +S3. No ambrocio M/R/G. No LE edema.  2+ radial pulses, 2+ DP pulses  Resp: CTAB. No wheezing or crackles/rhonchi. Normal respiratory effort.  Abdomen: Soft, NT, no ambrocio hepatomegaly, obese.  MSK/Ext: No clubbing or cyanosis.  Skin: Warm and dry, no rashes.  Neurological: CN III-XII grossly intact. No focal deficits.   Psych: A&O x 3, appropriate affect, good judgement      Assessment:     1. Essential hypertension     2. Mixed hyperlipidemia     3. Hypertrophic obstructive cardiomyopathy (CMS-HCC)     4. WPW (Ernesto-Parkinson-White syndrome) s/p ablation 1997     5. Type 2 diabetes mellitus with hyperglycemia, without long-term current use of insulin (HCC)     6. Diabetes mellitus type 2 in obese (HCC)     7. Pure hypercholesterolemia     8. Incomplete RBBB     9. LVH (left ventricular hypertrophy)     10. Family history of coronary artery disease in father     11. MEYER (dyspnea on exertion)     12. Obstructive hypertrophic cardiomyopathy (HCC)  MR-CARDIAC MORPH/FUNC WITH & W/O       Medical Decision Making:  Today's Assessment / Status / Plan:     -I do not think she actually had HCM but rather LVH from DM and HTN, will get cardiac MRI  -BP and DM control is key  -Has some PRN hydralazine at home already  -Does not need aggressive HR control  -Has risk factors for CAD, start ASA  -She is a responder to lipitor    Written instructions given today:  -Cardiac MRI to look at the heart muscle to see if this is concentric left ventricular hypertrophy related to DM and HTN or actual HCM.  -BP control, want to keep is in general under 130/80, if you are not able to keep it in this range, send me a [employer] message with your blood pressures and we can adjust meds over the phone or via messaging.  -I would consider adding HCTZ again at 12.5 mg, I would need to check some blood work after to monitor potassium  -You should always hear results of  testing within 5 days with my interpretation, if you do not, send a [employer] message or call the office: [phone].      Return in about 6 months (around 9/15/2019).    It is my pleasure to participate in the care of Ms. Chapman.  Please do not hesitate to contact me with questions or concerns.    Cat Manuel MD, [facility]  Cardiologist [facility] for [facility]    Please note that this dictation was created using voice recognition software. I have made every reasonable attempt to correct obvious errors, but it is possible there are errors of grammar and possibly content that I did not discover before finalizing the note.

## 2019-03-15 NOTE — PATIENT INSTRUCTIONS
-Cardiac MRI to look at the heart muscle to see if this is concentric left ventricular hypertrophy related to DM and HTN or actual HCM.  -BP control, want to keep is in general under 130/80, if you are not able to keep it in this range, send me a [employer] message with your blood pressures and we can adjust meds over the phone or via messaging.  -I would consider adding HCTZ again at 12.5 mg, I would need to check some blood work after to monitor potassium  -Start ASA at 81 mg daily  -You should always hear results of testing within 5 days with my interpretation, if you do not, send a [employer] message or call the office: [phone].

## 2019-03-20 DIAGNOSIS — E11.00 TYPE 2 DIABETES MELLITUS WITH HYPEROSMOLARITY WITHOUT COMA, WITHOUT LONG-TERM CURRENT USE OF INSULIN (HCC): ICD-10-CM

## 2019-03-21 ENCOUNTER — HOSPITAL ENCOUNTER (EMERGENCY)
Facility: MEDICAL CENTER | Age: 59
End: 2019-03-21
Attending: EMERGENCY MEDICINE
Payer: MEDICARE

## 2019-03-21 VITALS
HEART RATE: 81 BPM | TEMPERATURE: 97.6 F | OXYGEN SATURATION: 98 % | SYSTOLIC BLOOD PRESSURE: 155 MMHG | BODY MASS INDEX: 33.55 KG/M2 | WEIGHT: 182.32 LBS | RESPIRATION RATE: 16 BRPM | HEIGHT: 62 IN | DIASTOLIC BLOOD PRESSURE: 94 MMHG

## 2019-03-21 DIAGNOSIS — K40.90 RIGHT INGUINAL HERNIA: ICD-10-CM

## 2019-03-21 DIAGNOSIS — R10.32 LEFT GROIN PAIN: ICD-10-CM

## 2019-03-21 PROCEDURE — 99283 EMERGENCY DEPT VISIT LOW MDM: CPT

## 2019-03-21 NOTE — ED TRIAGE NOTES
Pt ambulated to triage with   Chief Complaint   Patient presents with   • Groin Pain     told by PCP hernia right side; increasing pain with movement, new dx       Pt Informed regarding triage process and verbalized understanding to inform triage tech or RN for any changes in condition. Placed in lobby.

## 2019-03-21 NOTE — ED NOTES
This RN was able to talk to the pt over the phone and instructed pt to follow up w/Dr. Florian Andrew regarding her Hernia and to return if worse, RN about to give the phone number but pt states that she will just google the phone number.

## 2019-03-21 NOTE — ED NOTES
Discharge instructions given to pt by ERP including follow up with Surgeon or returning if no improvement of symptoms or to return if worse. Questions answered by RN. Denies any new complaints. Discharged w/stable vitals and able to ambulate w/steady gait. Pt already left before signing discharge paper work.

## 2019-03-21 NOTE — DISCHARGE INSTRUCTIONS
Please try to reduce the hernia if you feel a bulging in her right groin by pushing with direct pressure for over 5 minutes.  If you unable to reduce it please return to the emergency department for further evaluation.  Please call Dr. Andrew's office for an elective surgical evaluation.  Follow up with your primary care physician for re-evaluation of your blood pressure.

## 2019-03-21 NOTE — ED PROVIDER NOTES
ED Provider Note    Scribed for Poncho Costello D.O. by Benny Mcmahan. 3/21/2019  3:23 PM    Primary care provider: Madonna Jaimes M.D.  Means of arrival: Private vehicle  History obtained from: Patient  History limited by: None    CHIEF COMPLAINT  Chief Complaint   Patient presents with   • Groin Pain     told by PCP hernia right side; increasing pain with movement, new dx       HPI  Kizzy Chapman is a 58 y.o. female who presents to the Emergency Department for evaluation of intermittent groin pain onset several ago. She first noticed this pain at work and it has not improved. She had an ultrasound performed at [facility] and she was found to have an inguinal hernia in the location of her pain, according to the patient. Movement exacerbates her pain and nothing alleviates it. She denies any dysuria, nausea, emesis, fever, melena, and hematochezia.    REVIEW OF SYSTEMS  Pertinent positives include groin pain. Pertinent negatives include no dysuria, nausea, emesis, fever, melena, and hematochezia.      PAST MEDICAL HISTORY  Past Medical History:   Diagnosis Date   • Anxiety    • Diabetes (HCC)    • Hypercholesterolemia    • Hypertension    • Hypertrophic cardiomyopathy (HCC)    • Hypertrophic obstructive cardiomyopathy(425.11) 4/1/2015   • Obesity    • Psychiatric disorder        SURGICAL HISTORY  Past Surgical History:   Procedure Laterality Date   • OTHER CARDIAC SURGERY      ablation of heart for WPW   • OTHER ORTHOPEDIC SURGERY      tib fib with pins; left total hip replacement        SOCIAL HISTORY  Social History   Substance Use Topics   • Smoking status: Former Smoker     Packs/day: 0.10     Years: 10.00     Types: Cigarettes     Quit date: 4/27/2015   • Smokeless tobacco: Never Used   • Alcohol use Yes      Comment: occasional      History   Drug Use No       FAMILY HISTORY  Family History   Problem Relation Age of Onset   • Heart Attack Mother 73        3 stents, alive at 91   • Heart  "Attack Father 58         of MI   • Colon Cancer Father 58        on chemo when MI happened   • Other Brother          of MVA at 44, had lymphoma from agent orange       CURRENT MEDICATIONS  Home Medications     Reviewed by Yuly Austin R.N. (Registered Nurse) on 19 at 1450  Med List Status: Partial   Medication Last Dose Status   aspirin EC (ECOTRIN) 81 MG Tablet Delayed Response 3/20/2019 Active   atorvastatin (LIPITOR) 80 MG tablet 3/20/2019 Active   B Complex Vitamins (VITAMIN B COMPLEX PO) 3/21/2019 Active   Calcium Carbonate-Vitamin D (CALCIUM + D PO) not taking Active   diazePAM (VALIUM) 5 MG Tab 3/20/2019 Active   DILTIAZem CD (CARDIZEM CD) 120 MG CAPSULE SR 24 HR 3/20/2019 Active   insulin aspart (NOVOLOG) 100 UNIT/ML Solution 3/20/2019 Active   insulin glargine (LANTUS) 100 UNIT/ML SOLN 3/20/2019 Active   MAGNESIUM PO not taking Active   metformin (GLUCOPHAGE) 1000 MG tablet 3/21/2019 Active   metoprolol SR (TOPROL XL) 100 MG TABLET SR 24 HR 3/21/2019 Active   vitamin D, Ergocalciferol, (DRISDOL) 28901 units Cap capsule 3/17/2019 Active                ALLERGIES  Allergies   Allergen Reactions   • Epinephrine      Tightness in arms and legs       PHYSICAL EXAM  VITAL SIGNS: /94   Pulse 81   Temp 36.4 °C (97.6 °F) (Temporal)   Resp 16   Ht 1.575 m (5' 2\")   Wt 82.7 kg (182 lb 5.1 oz)   LMP 2016   SpO2 98%   BMI 33.35 kg/m²   Constitutional: Well developed, Well nourished, No acute distress, Non-toxic appearance.   Thorax & Lungs:  No respiratory distress, No rales, No rhonchi, No wheezing, No chest tenderness.   Abdomen: Bowel sounds normal, Soft, No guarding, No rebound, No pulsatile masses. Slight tenderness to the right lower quadrant, no evidence of palpable mass, no hernia noted.  No evidence of incarcerated strangling hernia.    COURSE & MEDICAL DECISION MAKING  Nursing notes, VS, PMSFHx reviewed in chart.    3:23 PM - Patient seen and examined at bedside. I " discussed the different hernias and informed her that her next step is to follow up with a general surgeon for evaluation and possible surgical intervention. I have advised following up with Dr. Andrew, General Surgery, but will need to obtain the images from [facility] for him. The patient is to return for further evaluation should the patient's symptoms worsen. The patient understands and agrees to discharge home.    This is a charming 58 y.o. female that presents with historical complaint hernia.  Here in the emergency department, she has no evidence of strangling hernia, she has no evidence of hernia at all.  She does have ultrasound findings are consistent.  She states she thinks her hernia is coming and going.  She does not know where to go from here.  I have referred her to Dr. Ortiz for further evaluation and management, I have instructed her to return to the emergency department she has increasing pain, unable to reduce a hernia if it does present itself.    DISPOSITION:  Patient will be discharged home in stable condition.    FOLLOW UP:  [facility], Emergency Dept  [address] [address]  [phone]    If symptoms worsen    Florian Andrew M.D.  [address]  [address]  [facility] [address]  [phone]    Schedule an appointment as soon as possible for a visit in 1 day        OUTPATIENT MEDICATIONS:  New Prescriptions    No medications on file       FINAL IMPRESSION  1. Left groin pain Active   2. Right inguinal hernia Active         Benny ORTIZ (Jeremiahibalicia), am scribing for, and in the presence of, Poncho Costello D.O.    Electronically signed by: Benny Mcmahan (Delfina), 3/21/2019    IPoncho D.O. personally performed the services described in this documentation, as scribed by Benny Mcmahan in my presence, and it is both accurate and complete. E    The note accurately reflects work and decisions made by me.   Poncho Costello  3/21/2019  3:51 PM

## 2019-04-11 ENCOUNTER — TELEPHONE (OUTPATIENT)
Dept: CARDIOLOGY | Facility: MEDICAL CENTER | Age: 59
End: 2019-04-11

## 2019-04-11 DIAGNOSIS — I10 ESSENTIAL HYPERTENSION: ICD-10-CM

## 2019-04-11 DIAGNOSIS — I42.1 HYPERTROPHIC OBSTRUCTIVE CARDIOMYOPATHY (HCC): ICD-10-CM

## 2019-04-11 RX ORDER — METOPROLOL SUCCINATE 100 MG/1
200 TABLET, EXTENDED RELEASE ORAL DAILY
Qty: 180 TAB | Refills: 1 | Status: SHIPPED | OUTPATIENT
Start: 2019-04-11 | End: 2019-10-03 | Stop reason: SDUPTHER

## 2019-04-11 NOTE — TELEPHONE ENCOUNTER
"Rosy Zelaya R.N.             VINCENT/Tiffanie     Patient is calling to discuss her Metoprolol medication, and she also needs her MRI order changed to \"stat\". She would like a call back at [phone].      Spoke to pt. She has been taking Metoprolol  mg daily, instead of 100 mg daily, so she is completely out. She got emergency supply of 2 pills from pharmacy today. Pt states she took metoprolol that way before so she started taking it like that again. Pt states her blood pressure has been \"perfect\", always 130/80 or less and she has been doing fine on current dose. Discussed w/ADD, Dr. KELSIE Wiggins; ok to fill XL at 200 mg daily. Rx sent; pt notified.    Pt also states she needs to have hernia surgery, but surgeon would like to have the cardiac MRI that's ordered done prior. The MRI is currently scheduled for 6/4/19. The pt would like to know if it can be ordered STAT so she can have the surgery.        "

## 2019-04-12 NOTE — TELEPHONE ENCOUNTER
We have strict guidelines for stat so I do not think I would order it stat even if I wanted to.  I did contact Don Gonzalez by email in the radiologist department asking him to make it ASAP. His phone number is [phone], or [phone]  Or spectra link [phone] if you are able to follow up with him on Friday afternoon while I am in [place].  LS

## 2019-04-23 ENCOUNTER — HOSPITAL ENCOUNTER (OUTPATIENT)
Dept: RADIOLOGY | Facility: MEDICAL CENTER | Age: 59
End: 2019-04-23
Attending: INTERNAL MEDICINE
Payer: MEDICARE

## 2019-04-23 DIAGNOSIS — I42.1 OBSTRUCTIVE HYPERTROPHIC CARDIOMYOPATHY (HCC): ICD-10-CM

## 2019-04-23 LAB
ALBUMIN SERPL-MCNC: 4.3 G/DL (ref 3.5–5.5)
ALBUMIN/GLOB SERPL: 1.8 {RATIO} (ref 1.2–2.2)
ALP SERPL-CCNC: 112 IU/L (ref 39–117)
ALT SERPL-CCNC: 22 IU/L (ref 0–32)
AST SERPL-CCNC: 11 IU/L (ref 0–40)
BILIRUB SERPL-MCNC: 0.3 MG/DL (ref 0–1.2)
BUN SERPL-MCNC: 14 MG/DL (ref 6–24)
BUN/CREAT SERPL: 22 (ref 9–23)
CALCIUM SERPL-MCNC: 9.8 MG/DL (ref 8.7–10.2)
CHLORIDE SERPL-SCNC: 100 MMOL/L (ref 96–106)
CHOLEST SERPL-MCNC: 184 MG/DL (ref 100–199)
CO2 SERPL-SCNC: 21 MMOL/L (ref 20–29)
CREAT SERPL-MCNC: 0.65 MG/DL (ref 0.57–1)
GLOBULIN SER CALC-MCNC: 2.4 G/DL (ref 1.5–4.5)
GLUCOSE SERPL-MCNC: 270 MG/DL (ref 65–99)
HDLC SERPL-MCNC: 39 MG/DL
LABORATORY COMMENT REPORT: ABNORMAL
LDLC SERPL CALC-MCNC: 82 MG/DL (ref 0–99)
POTASSIUM SERPL-SCNC: 4.9 MMOL/L (ref 3.5–5.2)
PROT SERPL-MCNC: 6.7 G/DL (ref 6–8.5)
SODIUM SERPL-SCNC: 139 MMOL/L (ref 134–144)
TRIGL SERPL-MCNC: 314 MG/DL (ref 0–149)
VLDLC SERPL CALC-MCNC: 63 MG/DL (ref 5–40)

## 2019-04-24 ENCOUNTER — TELEPHONE (OUTPATIENT)
Dept: CARDIOLOGY | Facility: MEDICAL CENTER | Age: 59
End: 2019-04-24

## 2019-04-24 NOTE — LETTER
April 24, 2019        Kizzy Chapman  [address] [place] [id number]          Dear Kizzy,    We have received the results of your recent: Comprehensive Metabolic Panel    We have tried to reach you and were unsuccessful.  Your test came back mostly within normal limits.  Your glucose and triglycerides are high.  This is probably due to your diabetes not being well controlled.  Please reduce your sweet, fats, total calories, and increase exercise.      Please follow up as previously discussed with your physician.  Feel free to call us with any questions.        Sincerely,          RIVKA Andrade R.N.  Electronically Signed

## 2019-04-24 NOTE — TELEPHONE ENCOUNTER
Rosy Zelaya R.N.             VINCENT/Tiffanie     Patient said she was unable to do the MRI today, and wants to know what Dr. Manuel wants to do going forward. She can be reached at [phone].      Attempted to call pt back to discuss. No answer; vm not set up.

## 2019-04-25 NOTE — TELEPHONE ENCOUNTER
Associated Results   Comp Metabolic Panel   Order: [id number]   Status:  Final result   Visible to patient:  Yes (MyChart)   Notes recorded by WAYNE Keating on 4/24/2019 at 3:35 PM PDT    Please inform patient her labs are good except for glucose and triglycerides.  This is probably due to diabetes not being well controlled.  Reduce sweets, fats and total calories.  Increase exercise.     Attempted to call patient, unable to reach.  Unable to leave voicemail as voicemail is not set up.    Letter printed with [employer]N recommendations.  Letter mailed to mailing address.

## 2019-04-29 NOTE — TELEPHONE ENCOUNTER
Pt called back. She was unable to tolerate the MRI procedure and could not complete. Pt would like to know what the next step would be (I.e.-a different test) as she also needs cardiac clearance to proceed with hernia surgery.    To Dr. Manuel--please advise.

## 2019-05-01 NOTE — TELEPHONE ENCOUNTER
Letter done, please fax.  No other testing then, just BP control and blood sugar control. No other way to test for HCM.  Repeat echo 1 year from last, please order for me.

## 2019-05-03 NOTE — TELEPHONE ENCOUNTER
Attempted to call pt again. No answer; no vm setup. Letter sent to pt to call office when available.

## 2019-05-20 ENCOUNTER — TELEPHONE (OUTPATIENT)
Dept: CARDIOLOGY | Facility: MEDICAL CENTER | Age: 59
End: 2019-05-20

## 2019-05-20 NOTE — TELEPHONE ENCOUNTER
returning tiffanie's call from 5/3   Received: Today   Message Contents   Tasha Gore, R.N.   Phone Number: [phone]             VINCENT/danii     Pt returning Tiffanie's call from 5/3, please call her today .        Patient states surgeon is Dr. Florian Andrew with [facility]  [phone]    Faxed letter [phone]

## 2019-10-03 DIAGNOSIS — I10 ESSENTIAL HYPERTENSION: ICD-10-CM

## 2019-10-03 DIAGNOSIS — I42.1 HYPERTROPHIC OBSTRUCTIVE CARDIOMYOPATHY (HCC): ICD-10-CM

## 2019-10-03 RX ORDER — METOPROLOL SUCCINATE 100 MG/1
TABLET, EXTENDED RELEASE ORAL
Qty: 180 TAB | Refills: 3 | Status: SHIPPED | OUTPATIENT
Start: 2019-10-03

## 2024-07-08 ENCOUNTER — HOSPITAL ENCOUNTER (EMERGENCY)
Facility: MEDICAL CENTER | Age: 64
End: 2024-07-08
Attending: EMERGENCY MEDICINE
Payer: MEDICARE

## 2024-07-08 ENCOUNTER — APPOINTMENT (OUTPATIENT)
Dept: RADIOLOGY | Facility: MEDICAL CENTER | Age: 64
End: 2024-07-08
Attending: EMERGENCY MEDICINE
Payer: MEDICARE

## 2024-07-08 VITALS
DIASTOLIC BLOOD PRESSURE: 74 MMHG | HEART RATE: 70 BPM | BODY MASS INDEX: 28.32 KG/M2 | SYSTOLIC BLOOD PRESSURE: 152 MMHG | OXYGEN SATURATION: 97 % | HEIGHT: 61 IN | RESPIRATION RATE: 18 BRPM | WEIGHT: 150 LBS

## 2024-07-08 DIAGNOSIS — S09.90XA CLOSED HEAD INJURY, INITIAL ENCOUNTER: ICD-10-CM

## 2024-07-08 DIAGNOSIS — S32.130A CLOSED NONDISPLACED ZONE III FRACTURE OF SACRUM, INITIAL ENCOUNTER (HCC): ICD-10-CM
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
## 2024-07-08 PROCEDURE — 304217 HCHG IRRIGATION SYSTEM

## 2024-07-08 PROCEDURE — 304999 HCHG REPAIR-SIMPLE/INTERMED LEVEL 1

## 2024-07-08 PROCEDURE — 700111 HCHG RX REV CODE 636 W/ 250 OVERRIDE (IP): Performed by: EMERGENCY MEDICINE

## 2024-07-08 PROCEDURE — 70450 CT HEAD/BRAIN W/O DYE: CPT

## 2024-07-08 PROCEDURE — 90471 IMMUNIZATION ADMIN: CPT

## 2024-07-08 PROCEDURE — 90715 TDAP VACCINE 7 YRS/> IM: CPT | Performed by: EMERGENCY MEDICINE

## 2024-07-08 PROCEDURE — 96374 THER/PROPH/DIAG INJ IV PUSH: CPT | Mod: XU

## 2024-07-08 PROCEDURE — 96375 TX/PRO/DX INJ NEW DRUG ADDON: CPT | Mod: XU

## 2024-07-08 PROCEDURE — 99285 EMERGENCY DEPT VISIT HI MDM: CPT

## 2024-07-08 PROCEDURE — 700101 HCHG RX REV CODE 250: Mod: UD | Performed by: EMERGENCY MEDICINE

## 2024-07-08 PROCEDURE — 700102 HCHG RX REV CODE 250 W/ 637 OVERRIDE(OP): Mod: UD | Performed by: EMERGENCY MEDICINE

## 2024-07-08 PROCEDURE — A9270 NON-COVERED ITEM OR SERVICE: HCPCS | Mod: UD | Performed by: EMERGENCY MEDICINE

## 2024-07-08 PROCEDURE — 305308 HCHG STAPLER,SKIN,DISP.

## 2024-07-08 PROCEDURE — 72192 CT PELVIS W/O DYE: CPT

## 2024-07-08 RX ORDER — ONDANSETRON 2 MG/ML
4 INJECTION INTRAMUSCULAR; INTRAVENOUS ONCE
Status: COMPLETED | OUTPATIENT
Start: 2024-07-08 | End: 2024-07-08

## 2024-07-08 RX ORDER — OXYCODONE HYDROCHLORIDE 5 MG/1
5 TABLET ORAL EVERY 4 HOURS PRN
Qty: 30 TABLET | Refills: 0 | Status: SHIPPED | OUTPATIENT
Start: 2024-07-08 | End: 2024-07-23

## 2024-07-08 RX ORDER — OXYCODONE HYDROCHLORIDE 5 MG/1
5 TABLET ORAL ONCE
Status: COMPLETED | OUTPATIENT
Start: 2024-07-08 | End: 2024-07-08

## 2024-07-08 RX ORDER — LIDOCAINE HYDROCHLORIDE AND EPINEPHRINE 10; 10 MG/ML; UG/ML
20 INJECTION, SOLUTION INFILTRATION; PERINEURAL ONCE
Status: COMPLETED | OUTPATIENT
Start: 2024-07-08 | End: 2024-07-08

## 2024-07-08 RX ORDER — HYDROMORPHONE HYDROCHLORIDE 1 MG/ML
0.5 INJECTION, SOLUTION INTRAMUSCULAR; INTRAVENOUS; SUBCUTANEOUS
Status: DISCONTINUED | OUTPATIENT
Start: 2024-07-08 | End: 2024-07-08 | Stop reason: HOSPADM

## 2024-07-08 RX ADMIN — LIDOCAINE HYDROCHLORIDE AND EPINEPHRINE 20 ML: 10; 10 INJECTION, SOLUTION INFILTRATION; PERINEURAL at 14:45

## 2024-07-08 RX ADMIN — ONDANSETRON 4 MG: 2 INJECTION INTRAMUSCULAR; INTRAVENOUS at 13:02

## 2024-07-08 RX ADMIN — OXYCODONE 5 MG: 5 TABLET ORAL at 14:36

## 2024-07-08 RX ADMIN — CLOSTRIDIUM TETANI TOXOID ANTIGEN (FORMALDEHYDE INACTIVATED), CORYNEBACTERIUM DIPHTHERIAE TOXOID ANTIGEN (FORMALDEHYDE INACTIVATED), BORDETELLA PERTUSSIS TOXOID ANTIGEN (GLUTARALDEHYDE INACTIVATED), BORDETELLA PERTUSSIS FILAMENTOUS HEMAGGLUTININ ANTIGEN (FORMALDEHYDE INACTIVATED), BORDETELLA PERTUSSIS PERTACTIN ANTIGEN, AND BORDETELLA PERTUSSIS FIMBRIAE 2/3 ANTIGEN 0.5 ML: 5; 2; 2.5; 5; 3; 5 INJECTION, SUSPENSION INTRAMUSCULAR at 13:03

## 2024-07-08 RX ADMIN — HYDROMORPHONE HYDROCHLORIDE 0.5 MG: 1 INJECTION, SOLUTION INTRAMUSCULAR; INTRAVENOUS; SUBCUTANEOUS at 13:08
